# Patient Record
Sex: FEMALE | Race: BLACK OR AFRICAN AMERICAN | Employment: OTHER | RURAL
[De-identification: names, ages, dates, MRNs, and addresses within clinical notes are randomized per-mention and may not be internally consistent; named-entity substitution may affect disease eponyms.]

---

## 2020-03-04 ENCOUNTER — OFFICE VISIT (OUTPATIENT)
Dept: INTERNAL MEDICINE CLINIC | Age: 70
End: 2020-03-04

## 2020-03-04 VITALS
SYSTOLIC BLOOD PRESSURE: 128 MMHG | HEIGHT: 65 IN | HEART RATE: 95 BPM | RESPIRATION RATE: 15 BRPM | WEIGHT: 173 LBS | DIASTOLIC BLOOD PRESSURE: 82 MMHG | TEMPERATURE: 98.1 F | BODY MASS INDEX: 28.82 KG/M2 | OXYGEN SATURATION: 98 %

## 2020-03-04 DIAGNOSIS — I49.5 SICK SINUS SYNDROME (HCC): ICD-10-CM

## 2020-03-04 DIAGNOSIS — M17.11 ARTHRITIS OF KNEE, RIGHT: ICD-10-CM

## 2020-03-04 DIAGNOSIS — E78.5 HYPERLIPIDEMIA, UNSPECIFIED HYPERLIPIDEMIA TYPE: ICD-10-CM

## 2020-03-04 DIAGNOSIS — I10 ESSENTIAL HYPERTENSION: ICD-10-CM

## 2020-03-04 DIAGNOSIS — M32.9 LUPUS (HCC): ICD-10-CM

## 2020-03-04 DIAGNOSIS — M48.062 SPINAL STENOSIS OF LUMBAR REGION WITH NEUROGENIC CLAUDICATION: ICD-10-CM

## 2020-03-04 DIAGNOSIS — F32.1 EPISODE OF MODERATE MAJOR DEPRESSION (HCC): Primary | ICD-10-CM

## 2020-03-04 PROBLEM — M48.061 LUMBAR STENOSIS: Status: ACTIVE | Noted: 2020-03-04

## 2020-03-04 RX ORDER — MELATONIN
2000 DAILY
COMMUNITY

## 2020-03-04 RX ORDER — NITROGLYCERIN 80 MG/1
PATCH TRANSDERMAL
COMMUNITY
Start: 2019-12-07 | End: 2022-01-05

## 2020-03-04 RX ORDER — SIMVASTATIN 40 MG/1
TABLET, FILM COATED ORAL
COMMUNITY
Start: 2020-02-19 | End: 2020-05-21 | Stop reason: SDUPTHER

## 2020-03-04 RX ORDER — TRAMADOL HYDROCHLORIDE 50 MG/1
TABLET ORAL
COMMUNITY
End: 2020-09-30 | Stop reason: ALTCHOICE

## 2020-03-04 RX ORDER — CILOSTAZOL 50 MG/1
TABLET ORAL
COMMUNITY
End: 2020-07-02 | Stop reason: SDUPTHER

## 2020-03-04 RX ORDER — HYDROCHLOROTHIAZIDE 12.5 MG/1
TABLET ORAL
Qty: 30 TAB | Refills: 5 | Status: SHIPPED | OUTPATIENT
Start: 2020-03-04 | End: 2020-07-01 | Stop reason: SDUPTHER

## 2020-03-04 RX ORDER — CARVEDILOL 25 MG/1
TABLET ORAL
COMMUNITY
End: 2020-03-24 | Stop reason: SDUPTHER

## 2020-03-04 RX ORDER — FLUOCINONIDE 0.5 MG/G
CREAM TOPICAL
Qty: 45 G | Refills: 2 | Status: SHIPPED | OUTPATIENT
Start: 2020-03-04 | End: 2020-03-05

## 2020-03-04 RX ORDER — CLONIDINE HYDROCHLORIDE 0.2 MG/1
TABLET ORAL
Qty: 60 TAB | Refills: 5 | Status: SHIPPED | OUTPATIENT
Start: 2020-03-04 | End: 2020-09-21 | Stop reason: SDUPTHER

## 2020-03-04 RX ORDER — CLONIDINE HYDROCHLORIDE 0.2 MG/1
TABLET ORAL
COMMUNITY
End: 2020-03-04 | Stop reason: SDUPTHER

## 2020-03-04 RX ORDER — SERTRALINE HYDROCHLORIDE 50 MG/1
TABLET, FILM COATED ORAL
Qty: 30 TAB | Refills: 5 | Status: SHIPPED | OUTPATIENT
Start: 2020-03-04 | End: 2020-08-29

## 2020-03-04 RX ORDER — HYDROCHLOROTHIAZIDE 12.5 MG/1
TABLET ORAL
COMMUNITY
End: 2020-03-04 | Stop reason: SDUPTHER

## 2020-03-04 RX ORDER — FLUOCINONIDE 0.5 MG/G
CREAM TOPICAL
COMMUNITY
Start: 2014-05-06 | End: 2020-03-04 | Stop reason: SDUPTHER

## 2020-03-04 NOTE — PROGRESS NOTES
Chief Complaint   Patient presents with   [address]     I have reviewed the patient's medical history in detail and updated the computerized patient record. Health Maintenance reviewed. Encouraged pt to discuss pt's wishes with spouse/partner/family and bring them in the next appt to follow thru with the Advanced Directive    Fall Risk Assessment, last 12 mths 3/4/2020   Able to walk? Yes   Fall in past 12 months? No       3 most recent PHQ Screens 3/4/2020   Little interest or pleasure in doing things Nearly every day   Feeling down, depressed, irritable, or hopeless Nearly every day   Total Score PHQ 2 6       Abuse Screening Questionnaire 3/4/2020   Do you ever feel afraid of your partner? N   Are you in a relationship with someone who physically or mentally threatens you? N   Is it safe for you to go home?  Y       ADL Assessment 3/4/2020   Feeding yourself No Help Needed   Getting from bed to chair No Help Needed   Getting dressed No Help Needed   Bathing or showering No Help Needed   Walk across the room (includes cane/walker) No Help Needed   Using the telphone No Help Needed   Taking your medications No Help Needed   Preparing meals No Help Needed   Managing money (expenses/bills) No Help Needed   Moderately strenuous housework (laundry) No Help Needed   Shopping for personal items (toiletries/medicines) No Help Needed   Shopping for groceries No Help Needed   Driving No Help Needed   Climbing a flight of stairs No Help Needed   Getting to places beyond walking distances No Help Needed

## 2020-03-04 NOTE — PROGRESS NOTES
Subjective:     Lupe Dobson is a 65 y.o. female who presents for follow up of hypertension, hyperlipidemia, coronary artery disease and history of prior stroke. New concerns: I have lupus. Gets me down, see phq9. Patient is new to this clinic. Comes in to establish care. Previous care was with . Reason for the change is: re  Mood not good, not suicidal    Recent TKR, still bothering her some  Diet and Lifestyle: nonsmoker    Cardiovascular ROS: taking medications as instructed, no medication side effects noted, no TIA's, no chest pain on exertion, no dyspnea on exertion, no swelling of ankles. Review of Systems, additional:  Pertinent items are noted in HPI. No further syncope followed Grover Posadas has pacemaker    Patient Active Problem List    Diagnosis Date Noted    Lupus ([facility] 03/04/2020    Sick sinus syndrome ([facility] 03/04/2020    Essential hypertension 03/04/2020    Hyperlipidemia 03/04/2020    Lumbar stenosis 03/04/2020    [facility] of [facility] 03/04/2020    Arthritis of right knee 03/04/2020     Current Outpatient Medications   Medication Sig Dispense Refill    carvediloL (COREG) 25 mg tablet carvedilol 12.5 mg tabs      cholecalciferol (VITAMIN D3) (1000 Units /25 mcg) tablet Take 2,000 Units by mouth.       cilostazoL (PLETAL) 50 mg tablet cilostazol 50 mg tablet      nitroglycerin (NITRODUR) 0.4 mg/hr APPLY 1 PATCH FOR 12 TO 14 HOURS DAILY      simvastatin (ZOCOR) 40 mg tablet TAKE 1 TABLET BY MOUTH AT BEDTIME      traMADol (ULTRAM) 50 mg tablet tramadol 50 mg tablet   take 1 tablet by mouth every 6 hours if needed for pain for 14 days      sertraline (ZOLOFT) 50 mg tablet TAKE 1 TABLET BY MOUTH AT BEDTIME 30 Tab 5    cloNIDine HCL (CATAPRES) 0.2 mg tablet clonidine HCl 0.2 mg tablet  take 1 tablet by mouth twice a day 60 Tab 5    hydroCHLOROthiazide (HYDRODIURIL) 12.5 mg tablet hydrochlorothiazide 12.5 mg tablet  take 1 tablet by mouth once daily 30 Tab 5    fluocinoNIDE (LIDEX) 0.05 % topical cream fluocinonide 0.05 % topical cream  Prescribed by non [facility] MD 45 g 2     Allergies   Allergen Reactions    Codeine Hives    Lisinopril Angioedema     Past Medical History:   Diagnosis Date    Arrhythmia     Arthritis     Autoimmune disease ([facility]     CAD (coronary artery disease)     Chronic pain     Depression     Hypertension     Stroke [facility])      Past Surgical History:   Procedure Laterality Date    CARDIAC SURG PROCEDURE UNLIST      pacemaker place    HX HEART CATHETERIZATION      HX HYSTERECTOMY      had cervical ca    HX KNEE REPLACEMENT Right 10/2019    VASCULAR SURGERY PROCEDURE UNLIST       Family History   Problem Relation Age of Onset    Lung Disease Father     Arthritis-osteo Daughter      Social History     Tobacco Use    Smoking status: Former Smoker     Last attempt to quit: 2015     Years since quittin.0    Smokeless tobacco: Former User     Quit date: 3/4/2015   Substance Use Topics    Alcohol use: Yes     Alcohol/week: 3.0 standard drinks     Types: 3 Cans of beer per week                 Objective:     Physical exam significant for the following: WNL    Visit Vitals  /82   Pulse 95   Temp 98.1 °F (36.7 °C) (Temporal)   Resp 15   Ht 5' 5\" (1.651 m)   Wt 173 lb (78.5 kg)   SpO2 98%   BMI 28.79 kg/m²     Appearance: alert, well appearing, and in no distress. General exam: CVS exam BP noted to be well controlled today in office, S1, S2 normal, no gallop, no murmur, chest clear, no JVD, no HSM, no edema. .   Assessment/Plan:     hypertension stable, hyperlipidemia stable, cerebrovascular disease stable. ICD-10-CM ICD-9-CM    1. Episode of moderate major depression (HCC) F32.1 296.22 sertraline (ZOLOFT) 50 mg tablet      cloNIDine HCL (CATAPRES) 0.2 mg tablet   2. Arthritis of knee, right M17.11 716.96 AMB SUPPLY ORDER   3. Lupus ([facility] M32.9 710.0    4.  Essential hypertension I10 401.9 hydroCHLOROthiazide (HYDRODIURIL) 12.5 mg tablet   5. Hyperlipidemia, unspecified hyperlipidemia type E78.5 272.4    6. Sick sinus syndrome (HCC) I49.5 427.81    7. Spinal stenosis of lumbar region with neurogenic claudication M48.062 724.03      Orders Placed This Encounter    AMB SUPPLY ORDER     PT after right TKR 10 2019    carvediloL (COREG) 25 mg tablet     Sig: carvedilol 12.5 mg tabs    cholecalciferol (VITAMIN D3) (1000 Units /25 mcg) tablet     Sig: Take 2,000 Units by mouth.  cilostazoL (PLETAL) 50 mg tablet     Sig: cilostazol 50 mg tablet    DISCONTD: cloNIDine HCL (CATAPRES) 0.2 mg tablet     Sig: clonidine HCl 0.2 mg tablet   take 1 tablet by mouth twice a day    DISCONTD: fluocinoNIDE (LIDEX) 0.05 % topical cream     Sig: fluocinonide 0.05 % topical cream   Prescribed by carlos 942/446 Delmi Mann MD    DISCONTD: hydroCHLOROthiazide (HYDRODIURIL) 12.5 mg tablet     Sig: hydrochlorothiazide 12.5 mg tablet   take 1 tablet by mouth once daily    nitroglycerin (NITRODUR) 0.4 mg/hr     Sig: APPLY 1 PATCH FOR 12 TO 14 HOURS DAILY    simvastatin (ZOCOR) 40 mg tablet     Sig: TAKE 1 TABLET BY MOUTH AT BEDTIME    traMADol (ULTRAM) 50 mg tablet     Sig: tramadol 50 mg tablet   take 1 tablet by mouth every 6 hours if needed for pain for 14 days    sertraline (ZOLOFT) 50 mg tablet     Sig: TAKE 1 TABLET BY MOUTH AT BEDTIME     Dispense:  30 Tab     Refill:  5    cloNIDine HCL (CATAPRES) 0.2 mg tablet     Sig: clonidine HCl 0.2 mg tablet  take 1 tablet by mouth twice a day     Dispense:  60 Tab     Refill:  5    hydroCHLOROthiazide (HYDRODIURIL) 12.5 mg tablet     Sig: hydrochlorothiazide 12.5 mg tablet  take 1 tablet by mouth once daily     Dispense:  30 Tab     Refill:  5    DISCONTD: fluocinoNIDE (LIDEX) 0.05 % topical cream     Sig: fluocinonide 0.05 % topical cream  Prescribed by non [facility] MD     Dispense:  45 g     Refill:  2         We discussed this pain and the usage of controlled substances for arthritis pain relief.  In general these medications are indicated for the acute symptom relief and not for chronic usage, allthough they are frequently used for chronic management  After a certain period of time they should be stopped to avoid dependence and/or addiction. I warned her about the dangers of addiction and other side affects including respiratory depression and death. Discussed how this is a controlled substance and that the prescribing of it is should be monitored very carefully. Drug usage is also monitored by our practise and the [facility], since there has been a lot of abuse and diversion of controlled substances. In general we do not refill this medication over the phone. PLease ask for enough pills to get you to your next appointment and make sure you keep your appointments. Warned not to take other medications like alcohol, other benzodiazapines marijuana or other narcotics when on this medication. Current Outpatient Medications   Medication Sig Dispense Refill    carvediloL (COREG) 25 mg tablet carvedilol 12.5 mg tabs      cholecalciferol (VITAMIN D3) (1000 Units /25 mcg) tablet Take 2,000 Units by mouth.  cilostazoL (PLETAL) 50 mg tablet cilostazol 50 mg tablet      nitroglycerin (NITRODUR) 0.4 mg/hr APPLY 1 PATCH FOR 12 TO 14 HOURS DAILY      simvastatin (ZOCOR) 40 mg tablet TAKE 1 TABLET BY MOUTH AT BEDTIME      traMADol (ULTRAM) 50 mg tablet tramadol 50 mg tablet   take 1 tablet by mouth every 6 hours if needed for pain for 14 days      sertraline (ZOLOFT) 50 mg tablet TAKE 1 TABLET BY MOUTH AT BEDTIME 30 Tab 5    cloNIDine HCL (CATAPRES) 0.2 mg tablet clonidine HCl 0.2 mg tablet  take 1 tablet by mouth twice a day 60 Tab 5    hydroCHLOROthiazide (HYDRODIURIL) 12.5 mg tablet hydrochlorothiazide 12.5 mg tablet  take 1 tablet by mouth once daily 30 Tab 5    fluocinoNIDE (LIDEX) 0.05 % topical cream Apply 0.5 g to affected area two (2) times a day.  As needed 45 g 2     Discussed possible side affects, precautions, and drug interactions and possible benefits of the medication(s).     Follow-up and Dispositions    · Return in about 2 months (around 5/4/2020) for medicare annual.

## 2020-03-05 ENCOUNTER — TELEPHONE (OUTPATIENT)
Dept: INTERNAL MEDICINE CLINIC | Age: 70
End: 2020-03-05

## 2020-03-05 RX ORDER — FLUOCINONIDE 0.5 MG/G
0.5 CREAM TOPICAL 2 TIMES DAILY
Qty: 45 G | Refills: 2 | Status: SHIPPED | OUTPATIENT
Start: 2020-03-05

## 2020-03-24 RX ORDER — CARVEDILOL 25 MG/1
TABLET ORAL
Qty: 90 TAB | Refills: 1 | Status: SHIPPED | OUTPATIENT
Start: 2020-03-24 | End: 2020-05-19 | Stop reason: SDUPTHER

## 2020-03-24 NOTE — TELEPHONE ENCOUNTER
Requested Prescriptions     Pending Prescriptions Disp Refills    carvediloL (COREG) 25 mg tablet 90 Tab 1     Sig: carvedilol 12.5 mg tabs     Last office visit 3/2/20 future 4/1/20  Last filled  3/2/20  Changes made to medication on last visit    None

## 2020-05-04 ENCOUNTER — DOCUMENTATION ONLY (OUTPATIENT)
Dept: INTERNAL MEDICINE CLINIC | Age: 70
End: 2020-05-04

## 2020-05-20 ENCOUNTER — TELEPHONE (OUTPATIENT)
Dept: INTERNAL MEDICINE CLINIC | Age: 70
End: 2020-05-20

## 2020-05-20 RX ORDER — CARVEDILOL 25 MG/1
TABLET ORAL
Qty: 90 TAB | Refills: 1 | Status: SHIPPED | OUTPATIENT
Start: 2020-05-20 | End: 2020-09-30 | Stop reason: SDUPTHER

## 2020-05-20 NOTE — TELEPHONE ENCOUNTER
Shay Loo, pharmacist from [place], called in reference to needing pts directions for her coreg. Please call him at [phone].

## 2020-05-21 ENCOUNTER — TELEPHONE (OUTPATIENT)
Dept: INTERNAL MEDICINE CLINIC | Age: 70
End: 2020-05-21

## 2020-06-26 RX ORDER — CILOSTAZOL 50 MG/1
TABLET ORAL
OUTPATIENT
Start: 2020-06-26

## 2020-07-01 DIAGNOSIS — I10 ESSENTIAL HYPERTENSION: ICD-10-CM

## 2020-07-01 NOTE — TELEPHONE ENCOUNTER
Requested Prescriptions     Pending Prescriptions Disp Refills    hydroCHLOROthiazide (HYDRODIURIL) 12.5 mg tablet 90 Tab 1     Sig: hydrochlorothiazide 12.5 mg tablet  take 1 tablet by mouth once daily     Last office visit 3/4/20 future ?   Last filled  3/4/20  Changes made to medication on last visit      None

## 2020-07-02 RX ORDER — CILOSTAZOL 50 MG/1
50 TABLET ORAL DAILY
Qty: 90 TAB | Refills: 1 | Status: SHIPPED | OUTPATIENT
Start: 2020-07-02 | End: 2020-09-30

## 2020-07-02 RX ORDER — HYDROCHLOROTHIAZIDE 12.5 MG/1
TABLET ORAL
Qty: 90 TAB | Refills: 1 | Status: SHIPPED | OUTPATIENT
Start: 2020-07-02 | End: 2021-03-09

## 2020-07-06 ENCOUNTER — OFFICE VISIT (OUTPATIENT)
Dept: INTERNAL MEDICINE CLINIC | Age: 70
End: 2020-07-06

## 2020-07-06 VITALS
BODY MASS INDEX: 29.16 KG/M2 | TEMPERATURE: 99 F | DIASTOLIC BLOOD PRESSURE: 93 MMHG | RESPIRATION RATE: 16 BRPM | WEIGHT: 175 LBS | HEIGHT: 65 IN | OXYGEN SATURATION: 96 % | HEART RATE: 101 BPM | SYSTOLIC BLOOD PRESSURE: 161 MMHG

## 2020-07-06 DIAGNOSIS — F32.1 EPISODE OF MODERATE MAJOR DEPRESSION (HCC): ICD-10-CM

## 2020-07-06 DIAGNOSIS — I10 ESSENTIAL HYPERTENSION: ICD-10-CM

## 2020-07-06 DIAGNOSIS — I49.5 SICK SINUS SYNDROME (HCC): ICD-10-CM

## 2020-07-06 DIAGNOSIS — R42 DIZZINESS: Primary | ICD-10-CM

## 2020-07-06 DIAGNOSIS — M32.9 LUPUS (HCC): ICD-10-CM

## 2020-07-06 DIAGNOSIS — M17.11 ARTHRITIS OF RIGHT KNEE: ICD-10-CM
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
## 2020-07-06 NOTE — PROGRESS NOTES
HISTORY OF PRESENT Omer Sarmiento is a 65 y.o. female. Dizziness    The history is provided by the patient. This is a new problem. The current episode started more than 2 days ago (5 days since off pletal). The problem occurs hourly. Associated symptoms include dizziness. Pertinent negatives include no focal weakness. Fall   Incident onset: 5 days. The fall occurred while walking. She fell from a height of 3 - 5 ft. She landed on hard floor. There was no blood loss. The point of impact was the left knee, right knee and left shoulder. hit arm not head    Review of Systems   Musculoskeletal: Positive for falls. Neurological: Positive for dizziness. Negative for focal weakness. Patient Active Problem List   Diagnosis Code    Lupus ([facility] M32.9    Sick sinus syndrome ([facility] I49.5    Essential hypertension I10    Hyperlipidemia E78.5    Lumbar stenosis M48.061    Episode of moderate major depression ([facility] F32.1    Arthritis of right knee M17.11     Social History     Socioeconomic History    Marital status:      Spouse name: Not on file    Number of children: Not on file    Years of education: Not on file    Highest education level: Not on file   Occupational History    Not on file   Social Needs    Financial resource strain: Not on file    Food insecurity     Worry: Not on file     Inability: Not on file    Transportation needs     Medical: Not on file     Non-medical: Not on file   Tobacco Use    Smoking status: Former Smoker     Last attempt to quit: 2015     Years since quittin.3    Smokeless tobacco: Former User     Quit date: 3/4/2015   Substance and Sexual Activity    Alcohol use:  Yes     Alcohol/week: 3.0 standard drinks     Types: 3 Cans of beer per week    Drug use: Not Currently    Sexual activity: Not Currently   Lifestyle    Physical activity     Days per week: Not on file     Minutes per session: Not on file    Stress: Not on file   Relationships    Social connections     Talks on phone: Not on file     Gets together: Not on file     Attends Zoroastrianism service: Not on file     Active member of club or organization: Not on file     Attends meetings of clubs or organizations: Not on file     Relationship status: Not on file    Intimate partner violence     Fear of current or ex partner: Not on file     Emotionally abused: Not on file     Physically abused: Not on file     Forced sexual activity: Not on file   Other Topics Concern    Not on file   Social History Narrative    Not on file       Physical Exam  Visit Vitals  BP (!) 161/93   Pulse (!) 101   Temp 99 °F (37.2 °C) (Temporal)   Resp 16   Ht 5' 5\" (1.651 m)   Wt 175 lb (79.4 kg)   SpO2 96%   BMI 29.12 kg/m²     Well developed well nourished no acute distress. Pupils equal round react to light, extraocular muscles intact. Tympanic membranes within normal limits throat unremarkable  Cranial nerves II through XII are intact. Neck unremarkable  Heart regular rate and rhythm without clicks murmurs rubs  Lungs are clear to auscultation  Abdomen soft. Extremities, motor 5 out of 5 bilaterally, reflexes 2+ equal bilaterally. Gait no focality  ASSESSMENT and PLAN  Encounter Diagnoses   Name Primary?  Dizziness Yes    Fall, initial encounter     Sick sinus syndrome ([address]     Lupus ([address]     Essential hypertension     Episode of moderate major depression ([address]     Arthritis of right knee      Orders Placed This Encounter    XR KNEE RT MAX 2 VWS    XR SHOULDER LT AP/LAT MIN 2 V    CT HEAD WO CONT    REFERRAL TO CARDIOLOGY     W/u as above, r/o CVA  Cards to check pacemeaker. Follow-up and Dispositions    · Return in about 10 days (around 7/16/2020) for routine follow up.

## 2020-07-06 NOTE — PROGRESS NOTES
Chief Complaint   Patient presents with    Dizziness     out of med cilostazol for 4 days, dizziness and off balance for one week, loss of appetite, headaches, swelling around pacemaker upper L/chest    Fall     pt fell last week and landed on both knees     Health Maintenance reviewed. I have reviewed the patient's medical history in detail and updated the computerized patient record. Liam Fernandez

## 2020-08-29 DIAGNOSIS — F32.1 EPISODE OF MODERATE MAJOR DEPRESSION (HCC): ICD-10-CM

## 2020-08-29 RX ORDER — SERTRALINE HYDROCHLORIDE 50 MG/1
TABLET, FILM COATED ORAL
Qty: 30 TAB | Refills: 5 | Status: SHIPPED | OUTPATIENT
Start: 2020-08-29 | End: 2021-03-24

## 2020-09-10 ENCOUNTER — TELEPHONE (OUTPATIENT)
Dept: INTERNAL MEDICINE CLINIC | Age: 70
End: 2020-09-10

## 2020-09-10 DIAGNOSIS — Z12.39 SCREENING FOR BREAST CANCER: Primary | ICD-10-CM
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
## 2020-09-15 DIAGNOSIS — R42 DIZZINESS: ICD-10-CM

## 2020-09-21 ENCOUNTER — TELEPHONE (OUTPATIENT)
Dept: INTERNAL MEDICINE CLINIC | Age: 70
End: 2020-09-21

## 2020-09-21 DIAGNOSIS — F32.1 EPISODE OF MODERATE MAJOR DEPRESSION (HCC): ICD-10-CM

## 2020-09-21 NOTE — TELEPHONE ENCOUNTER
Requested Prescriptions     Pending Prescriptions Disp Refills    cloNIDine HCL (CATAPRES) 0.2 mg tablet 60 Tab 5     Sig: clonidine HCl 0.2 mg tablet  take 1 tablet by mouth twice a day       Pt said this medication should be twice a day. Please correct order and [facility].

## 2020-09-21 NOTE — TELEPHONE ENCOUNTER
Pt called in reference to her mammogram. She needs an order for a diagnostic mammo. 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 Please call her at [phone].

## 2020-09-22 RX ORDER — CLONIDINE HYDROCHLORIDE 0.2 MG/1
TABLET ORAL
Qty: 60 TAB | Refills: 5 | Status: SHIPPED | OUTPATIENT
Start: 2020-09-22 | End: 2021-03-22

## 2020-09-30 ENCOUNTER — OFFICE VISIT (OUTPATIENT)
Dept: INTERNAL MEDICINE CLINIC | Age: 70
End: 2020-09-30
Payer: MEDICARE

## 2020-09-30 VITALS
DIASTOLIC BLOOD PRESSURE: 86 MMHG | TEMPERATURE: 97.5 F | SYSTOLIC BLOOD PRESSURE: 137 MMHG | HEART RATE: 96 BPM | OXYGEN SATURATION: 98 % | WEIGHT: 177 LBS | RESPIRATION RATE: 16 BRPM | BODY MASS INDEX: 29.49 KG/M2 | HEIGHT: 65 IN

## 2020-09-30 DIAGNOSIS — Z23 ENCOUNTER FOR IMMUNIZATION: ICD-10-CM

## 2020-09-30 DIAGNOSIS — I25.118 CORONARY ARTERY DISEASE OF NATIVE ARTERY OF NATIVE HEART WITH STABLE ANGINA PECTORIS (HCC): ICD-10-CM

## 2020-09-30 DIAGNOSIS — R06.09 DYSPNEA ON EXERTION: Primary | ICD-10-CM

## 2020-09-30 DIAGNOSIS — R94.31 ABNORMAL EKG: ICD-10-CM

## 2020-09-30 DIAGNOSIS — R23.4 BREAST SKIN CHANGES: ICD-10-CM

## 2020-09-30 PROCEDURE — 1090F PRES/ABSN URINE INCON ASSESS: CPT | Performed by: FAMILY MEDICINE

## 2020-09-30 PROCEDURE — G8752 SYS BP LESS 140: HCPCS | Performed by: FAMILY MEDICINE

## 2020-09-30 PROCEDURE — 3017F COLORECTAL CA SCREEN DOC REV: CPT | Performed by: FAMILY MEDICINE

## 2020-09-30 PROCEDURE — G8754 DIAS BP LESS 90: HCPCS | Performed by: FAMILY MEDICINE

## 2020-09-30 PROCEDURE — G8536 NO DOC ELDER MAL SCRN: HCPCS | Performed by: FAMILY MEDICINE

## 2020-09-30 PROCEDURE — G8419 CALC BMI OUT NRM PARAM NOF/U: HCPCS | Performed by: FAMILY MEDICINE

## 2020-09-30 PROCEDURE — G0008 ADMIN INFLUENZA VIRUS VAC: HCPCS | Performed by: FAMILY MEDICINE

## 2020-09-30 PROCEDURE — G9717 DOC PT DX DEP/BP F/U NT REQ: HCPCS | Performed by: FAMILY MEDICINE

## 2020-09-30 PROCEDURE — 1100F PTFALLS ASSESS-DOCD GE2>/YR: CPT | Performed by: FAMILY MEDICINE

## 2020-09-30 PROCEDURE — G8427 DOCREV CUR MEDS BY ELIG CLIN: HCPCS | Performed by: FAMILY MEDICINE

## 2020-09-30 PROCEDURE — 3288F FALL RISK ASSESSMENT DOCD: CPT | Performed by: FAMILY MEDICINE

## 2020-09-30 PROCEDURE — 93000 ELECTROCARDIOGRAM COMPLETE: CPT | Performed by: FAMILY MEDICINE

## 2020-09-30 PROCEDURE — 99214 OFFICE O/P EST MOD 30 MIN: CPT | Performed by: FAMILY MEDICINE

## 2020-09-30 PROCEDURE — 90653 IIV ADJUVANT VACCINE IM: CPT

## 2020-09-30 PROCEDURE — G8400 PT W/DXA NO RESULTS DOC: HCPCS | Performed by: FAMILY MEDICINE

## 2020-09-30 RX ORDER — CILOSTAZOL 50 MG/1
50 TABLET ORAL 2 TIMES DAILY
Qty: 180 TAB | Refills: 3 | Status: SHIPPED | OUTPATIENT
Start: 2020-09-30 | End: 2021-10-02

## 2020-09-30 RX ORDER — SIMVASTATIN 40 MG/1
TABLET, FILM COATED ORAL
Qty: 90 TAB | Refills: 1 | Status: SHIPPED | OUTPATIENT
Start: 2020-09-30 | End: 2021-03-24 | Stop reason: SDUPTHER

## 2020-09-30 RX ORDER — CARVEDILOL 25 MG/1
25 TABLET ORAL DAILY
Qty: 90 TAB | Refills: 1 | Status: SHIPPED | OUTPATIENT
Start: 2020-09-30 | End: 2020-11-19 | Stop reason: SDUPTHER

## 2020-09-30 NOTE — PROGRESS NOTES
Chief Complaint   Patient presents with    Hypertension     med evaluation     Breast pain     L/breast sharp pain when pt moves a certain way after her fall     Knee Pain     R/knee feels heavy after the fall       Patient has not been out of the country in (6-7 months), NO diarrhea, NO cough, NO chest conjestion, NO temp. Pt has not been around anyone with these symptoms. Health Maintenance reviewed. I have reviewed the patient's medical history in detail and updated the computerized patient record. 1. Have you been to the ER, urgent care clinic since your last visit? Hospitalized since your last visit?no    2. Have you seen or consulted any other health care providers outside of the [address] [place] [address] since your last visit? Include any pap smears or colon screening. No      Encouraged pt to discuss pt's wishes with spouse/partner/family and bring them in the next appt to follow thru with the Advanced Directive    Fall Risk Assessment, last 12 mths 9/30/2020   Able to walk? Yes   Fall in past 12 months? Yes   Fall with injury?  Yes   Number of falls in past 12 months 1   Fall Risk Score 2       3 most recent PHQ Screens 9/30/2020   Little interest or pleasure in doing things More than half the days   Feeling down, depressed, irritable, or hopeless More than half the days   Total Score PHQ 2 4   Trouble falling or staying asleep, or sleeping too much -   Feeling tired or having little energy -   Poor appetite, weight loss, or overeating -   Feeling bad about yourself - or that you are a failure or have let yourself or your family down -   Trouble concentrating on things such as school, work, reading, or watching TV -   Moving or speaking so slowly that other people could have noticed; or the opposite being so fidgety that others notice -   Thoughts of being better off dead, or hurting yourself in some way -   PHQ 9 Score -   How difficult have these problems made it for you to do your work, take care of your home and get along with others -       Abuse Screening Questionnaire 9/30/2020   Do you ever feel afraid of your partner? N   Are you in a relationship with someone who physically or mentally threatens you? N   Is it safe for you to go home?  Y       ADL Assessment 9/30/2020   Feeding yourself No Help Needed   Getting from bed to chair No Help Needed   Getting dressed No Help Needed   Bathing or showering No Help Needed   Walk across the room (includes cane/walker) No Help Needed   Using the telphone No Help Needed   Taking your medications No Help Needed   Preparing meals No Help Needed   Managing money (expenses/bills) No Help Needed   Moderately strenuous housework (laundry) No Help Needed   Shopping for personal items (toiletries/medicines) No Help Needed   Shopping for groceries No Help Needed   Driving No Help Needed   Climbing a flight of stairs No Help Needed   Getting to places beyond walking distances No Help Needed

## 2020-09-30 NOTE — PROGRESS NOTES
Subjective:     Pauline Cortez is a 65 y.o. female who presents for follow up of hypertension, hyperlipidemia, coronary artery disease and sick sinus syndrome. New concerns: For the past few months complains of exertional sweating gets somewhat dyspneic as well. Not really having chest pain or chest tightness just gets sweaty. Cardiology care at [address] [place] [address], not had any recent cardiac studies done. Diet and Lifestyle: nonsmoker    Cardiovascular ROS: taking medications as instructed, no medication side effects noted, no TIA's, no chest pain on exertion, notes new dyspnea on exertion for a few months in addition to her sweating, no swelling of ankles. Review of Systems, additional:  Pertinent items are noted in HPI. Patient Active Problem List    Diagnosis Date Noted    Coronary artery disease with stable angina pectoris ([facility] 09/30/2020    Lupus ([facility] 03/04/2020    Sick sinus syndrome ([facility] 03/04/2020    Essential hypertension 03/04/2020    Hyperlipidemia 03/04/2020    Lumbar stenosis 03/04/2020    Episode of moderate major depression ([facility] 03/04/2020    Arthritis of right knee 03/04/2020     Current Outpatient Medications   Medication Sig Dispense Refill    cilostazoL (PLETAL) 50 mg tablet Take 1 Tab by mouth two (2) times a day. 180 Tab 3    carvediloL (COREG) 25 mg tablet Take 1 Tab by mouth daily. carvedilol 12.5 mg tabs 90 Tab 1    simvastatin (ZOCOR) 40 mg tablet TAKE 1 TABLET BY MOUTH AT BEDTIME 90 Tab 1    cloNIDine HCL (CATAPRES) 0.2 mg tablet clonidine HCl 0.2 mg tablet  take 1 tablet by mouth twice a day 60 Tab 5    sertraline (ZOLOFT) 50 mg tablet TAKE 1 TABLET BY MOUTH AT BEDTIME 30 Tab 5    hydroCHLOROthiazide (HYDRODIURIL) 12.5 mg tablet hydrochlorothiazide 12.5 mg tablet  take 1 tablet by mouth once daily 90 Tab 1    fluocinoNIDE (LIDEX) 0.05 % topical cream Apply 0.5 g to affected area two (2) times a day.  As needed 45 g 2    cholecalciferol (VITAMIN D3) (1000 Units /25 mcg) tablet Take 2,000 Units by mouth.  nitroglycerin (NITRODUR) 0.4 mg/hr APPLY 1 PATCH FOR 12 TO 14 HOURS DAILY       Allergies   Allergen Reactions    Codeine Hives    Lisinopril Angioedema     Social History     Tobacco Use    Smoking status: Former Smoker     Last attempt to quit: 2015     Years since quittin.5    Smokeless tobacco: Former User     Quit date: 3/4/2015   Substance Use Topics    Alcohol use: Yes     Alcohol/week: 3.0 standard drinks     Types: 3 Cans of beer per week                Objective:     Physical exam significant for the following:     Within normal limits    Visit Vitals  /86 (BP 1 Location: Left arm, BP Patient Position: Sitting)   Pulse 96   Temp 97.5 °F (36.4 °C) (Temporal)   Resp 16   Ht 5' 5\" (1.651 m)   Wt 177 lb (80.3 kg)   SpO2 98%   BMI 29.45 kg/m²     Appearance: alert, well appearing, and in no distress. General exam: CVS exam BP noted to be well controlled today in office, S1, S2 normal, no gallop, no murmur, chest clear, no JVD, no HSM, no edema. .   EKG performed shows T wave inversions especially in the lateral limb leads, normal sinus rhythm. No prior EKG to compare to but this study is compatible with angina. Assessment/Plan:     hypertension well controlled, stable, coronary artery disease control uncertain. ICD-10-CM ICD-9-CM    1. Dyspnea on exertion  R06.00 786.09 AMB POC EKG ROUTINE W/ 12 LEADS, INTER & REP      REFERRAL TO CARDIOLOGY      ADMIN INFLUENZA VIRUS VAC   2. Breast skin changes  R23.4 782.8 JESSI MAMMOGRAM DIAG BILAT SAME DAY INCL CAD      ADMIN INFLUENZA VIRUS VAC   3. Abnormal EKG  R94.31 794.31 REFERRAL TO CARDIOLOGY      ADMIN INFLUENZA VIRUS VAC   4. Coronary artery disease of native artery of native heart with stable angina pectoris (HCC)  I25.118 414.01 ADMIN INFLUENZA VIRUS VAC     413.9    5.  Encounter for immunization  Z23 V03.89 INFLUENZA VACCINE INACTIVATED (IIV), SUBUNIT, ADJUVANTED, IM      ADMIN INFLUENZA VIRUS VAC     Orders Placed This Encounter    JESSI MAMMOGRAM DIAG DIGITAL BILAT SAME DAY     Standing Status:   Future     Standing Expiration Date:   10/30/2021     Scheduling Instructions:      [facility]    INFLUENZA VACCINE INACTIVATED (IIV), SUBUNIT, ADJUVANTED, IM    REFERRAL TO CARDIOLOGY     Referral Priority:   Routine     Referral Type:   Consultation     Referral Reason:   Specialty Services Required     Referred to Provider:   Edgard Gant MD     Number of Visits Requested:   1    AMB POC EKG ROUTINE W/ 12 LEADS, INTER & REP     Order Specific Question:   Reason for Exam:     Answer:   exertional sweating    ADMIN INFLUENZA VIRUS VAC    cilostazoL (PLETAL) 50 mg tablet     Sig: Take 1 Tab by mouth two (2) times a day. Dispense:  180 Tab     Refill:  3    carvediloL (COREG) 25 mg tablet     Sig: Take 1 Tab by mouth daily. carvedilol 12.5 mg tabs     Dispense:  90 Tab     Refill:  1    simvastatin (ZOCOR) 40 mg tablet     Sig: TAKE 1 TABLET BY MOUTH AT BEDTIME     Dispense:  90 Tab     Refill:  1     Needs cardiac studies done, needs her pacemaker checked, needs to go back to cardiology for further evaluation, referral done. Continue current medications. Mammogram ordered  Follow-up 4 weeks, chest pain and dyspnea precautions given.
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
## 2020-10-05 DIAGNOSIS — M17.11 ARTHRITIS OF RIGHT KNEE: ICD-10-CM

## 2020-11-19 ENCOUNTER — TELEPHONE (OUTPATIENT)
Dept: INTERNAL MEDICINE CLINIC | Age: 70
End: 2020-11-19

## 2020-11-19 RX ORDER — CARVEDILOL 25 MG/1
25 TABLET ORAL DAILY
Qty: 90 TAB | Refills: 1 | Status: SHIPPED | OUTPATIENT
Start: 2020-11-19 | End: 2021-03-24 | Stop reason: SDUPTHER

## 2020-11-19 RX ORDER — CARVEDILOL 25 MG/1
25 TABLET ORAL DAILY
Qty: 90 TAB | Refills: 1 | Status: SHIPPED | OUTPATIENT
Start: 2020-11-19 | End: 2020-11-19 | Stop reason: SDUPTHER

## 2020-11-19 NOTE — TELEPHONE ENCOUNTER
Pharmacist states that he has received a prescription for Carvedilol 25mg, but in the sig it states 1 daily, but then 12.5mg tabs - unsure of what this direction means - please send in a new prescription with new instructions to [address], [place]  [id number]  4:42 PM

## 2020-11-19 NOTE — TELEPHONE ENCOUNTER
[facility] Pharmacist called questioning RX for Coreg. Directions say to take Coreg 25 mg once  a day. BUT pt is stating you told her to take 25 mg Coreg (1/2 tab 12.5mg) BID.   Which one????  I need to call Lianet and patient back

## 2021-03-06 DIAGNOSIS — I10 ESSENTIAL HYPERTENSION: ICD-10-CM

## 2021-03-09 RX ORDER — HYDROCHLOROTHIAZIDE 12.5 MG/1
TABLET ORAL
Qty: 90 TAB | Refills: 1 | Status: SHIPPED | OUTPATIENT
Start: 2021-03-09 | End: 2021-08-18

## 2021-03-22 DIAGNOSIS — F32.1 EPISODE OF MODERATE MAJOR DEPRESSION (HCC): ICD-10-CM

## 2021-03-22 RX ORDER — CLONIDINE HYDROCHLORIDE 0.2 MG/1
TABLET ORAL
Qty: 60 TAB | Refills: 5 | Status: SHIPPED | OUTPATIENT
Start: 2021-03-22 | End: 2021-09-13

## 2021-03-24 ENCOUNTER — VIRTUAL VISIT (OUTPATIENT)
Dept: INTERNAL MEDICINE CLINIC | Age: 71
End: 2021-03-24
Payer: MEDICARE

## 2021-03-24 DIAGNOSIS — Z13.6 SCREENING FOR ISCHEMIC HEART DISEASE: ICD-10-CM

## 2021-03-24 DIAGNOSIS — F32.1 EPISODE OF MODERATE MAJOR DEPRESSION (HCC): ICD-10-CM

## 2021-03-24 DIAGNOSIS — K63.5 POLYP OF DESCENDING COLON, UNSPECIFIED TYPE: ICD-10-CM

## 2021-03-24 DIAGNOSIS — Z13.31 SCREENING FOR DEPRESSION: ICD-10-CM

## 2021-03-24 DIAGNOSIS — M32.9 LUPUS (HCC): ICD-10-CM

## 2021-03-24 DIAGNOSIS — E78.5 HYPERLIPIDEMIA, UNSPECIFIED HYPERLIPIDEMIA TYPE: ICD-10-CM

## 2021-03-24 DIAGNOSIS — I10 ESSENTIAL HYPERTENSION: ICD-10-CM

## 2021-03-24 DIAGNOSIS — I49.5 SICK SINUS SYNDROME (HCC): ICD-10-CM

## 2021-03-24 DIAGNOSIS — Z00.00 MEDICARE ANNUAL WELLNESS VISIT, SUBSEQUENT: Primary | ICD-10-CM

## 2021-03-24 DIAGNOSIS — Z12.39 ENCOUNTER FOR SCREENING FOR MALIGNANT NEOPLASM OF BREAST, UNSPECIFIED SCREENING MODALITY: ICD-10-CM

## 2021-03-24 DIAGNOSIS — M81.0 AGE-RELATED OSTEOPOROSIS WITHOUT CURRENT PATHOLOGICAL FRACTURE: ICD-10-CM

## 2021-03-24 PROCEDURE — 99214 OFFICE O/P EST MOD 30 MIN: CPT | Performed by: FAMILY MEDICINE

## 2021-03-24 PROCEDURE — 1100F PTFALLS ASSESS-DOCD GE2>/YR: CPT | Performed by: FAMILY MEDICINE

## 2021-03-24 PROCEDURE — 3288F FALL RISK ASSESSMENT DOCD: CPT | Performed by: FAMILY MEDICINE

## 2021-03-24 PROCEDURE — G0439 PPPS, SUBSEQ VISIT: HCPCS | Performed by: FAMILY MEDICINE

## 2021-03-24 PROCEDURE — G8427 DOCREV CUR MEDS BY ELIG CLIN: HCPCS | Performed by: FAMILY MEDICINE

## 2021-03-24 PROCEDURE — 3017F COLORECTAL CA SCREEN DOC REV: CPT | Performed by: FAMILY MEDICINE

## 2021-03-24 PROCEDURE — G8756 NO BP MEASURE DOC: HCPCS | Performed by: FAMILY MEDICINE

## 2021-03-24 PROCEDURE — 1090F PRES/ABSN URINE INCON ASSESS: CPT | Performed by: FAMILY MEDICINE

## 2021-03-24 PROCEDURE — G8536 NO DOC ELDER MAL SCRN: HCPCS | Performed by: FAMILY MEDICINE

## 2021-03-24 PROCEDURE — G8419 CALC BMI OUT NRM PARAM NOF/U: HCPCS | Performed by: FAMILY MEDICINE

## 2021-03-24 PROCEDURE — G9717 DOC PT DX DEP/BP F/U NT REQ: HCPCS | Performed by: FAMILY MEDICINE

## 2021-03-24 RX ORDER — CARVEDILOL 25 MG/1
25 TABLET ORAL DAILY
Qty: 90 TAB | Refills: 1 | Status: SHIPPED | OUTPATIENT
Start: 2021-03-24 | End: 2021-10-04 | Stop reason: SDUPTHER

## 2021-03-24 RX ORDER — SIMVASTATIN 40 MG/1
TABLET, FILM COATED ORAL
Qty: 90 TAB | Refills: 1 | Status: SHIPPED | OUTPATIENT
Start: 2021-03-24 | End: 2021-08-30

## 2021-03-24 RX ORDER — SERTRALINE HYDROCHLORIDE 100 MG/1
100 TABLET, FILM COATED ORAL DAILY
Qty: 90 TAB | Refills: 3 | Status: SHIPPED | OUTPATIENT
Start: 2021-03-24 | End: 2021-09-21

## 2021-03-24 RX ORDER — SERTRALINE HYDROCHLORIDE 100 MG/1
100 TABLET, FILM COATED ORAL DAILY
Qty: 30 TAB | Refills: 5 | Status: SHIPPED | OUTPATIENT
Start: 2021-03-24 | End: 2021-03-24 | Stop reason: SDUPTHER

## 2021-03-24 NOTE — PROGRESS NOTES
This is the Subsequent Medicare Annual Wellness Exam, performed 12 months or more after the Initial AWV or the last Subsequent AWV    I have reviewed the patient's medical history in detail and updated the computerized patient record. States she feels poorly, complains of breast pain, generalized weakness. Wants a mammogram.  No chest pain or shortness of breath. Feels depressed not suicidal.  Tired all the time poor energy. Depression Risk Factor Screening:     3 most recent PHQ Screens 3/24/2021   Little interest or pleasure in doing things Several days   Feeling down, depressed, irritable, or hopeless Several days   Total Score PHQ 2 2   Trouble falling or staying asleep, or sleeping too much -   Feeling tired or having little energy -   Poor appetite, weight loss, or overeating -   Feeling bad about yourself - or that you are a failure or have let yourself or your family down -   Trouble concentrating on things such as school, work, reading, or watching TV -   Moving or speaking so slowly that other people could have noticed; or the opposite being so fidgety that others notice -   Thoughts of being better off dead, or hurting yourself in some way -   PHQ 9 Score -   How difficult have these problems made it for you to do your work, take care of your home and get along with others -       Alcohol Risk Screen    Do you average more than 1 drink per night or more than 7 drinks a week:  No    On any one occasion in the past three months have you have had more than 3 drinks containing alcohol:  No        Functional Ability and Level of Safety:    Hearing: Hearing is good. Activities of Daily Living: The home contains: grab bars  Patient does total self care      Ambulation: with difficulty, uses a cane and occa     Fall Risk:  Fall Risk Assessment, last 12 mths 3/24/2021   Able to walk? Yes   Fall in past 12 months? 0   Do you feel unsteady?  0   Are you worried about falling 0   Number of falls in past 12 months -   Fall with injury? -      Abuse Screen:  Patient is not abused       Cognitive Screening    Has your family/caregiver stated any concerns about your memory: yes - daughter     Cognitive Screening: Normal - 11/11    Assessment/Plan   Education and counseling provided:  Are appropriate based on today's review and evaluation  Screening Mammography  Cardiovascular screening blood test  Bone mass measurement (DEXA)  Diabetes screening test    Diagnoses and all orders for this visit:    1. Medicare annual wellness visit, subsequent    2. Screening for depression  -     DEPRESSION SCREEN ANNUAL    3. Screening for ischemic heart disease        ICD-10-CM ICD-9-CM    1. Encounter for screening for malignant neoplasm of breast, unspecified screening modality  Z12.39 V76.10 JESSI 3D AMADA W MAMMO BI DX INCL CAD   2. Medicare annual wellness visit, subsequent  Z00.00 V70.0    3. Screening for depression  Z13.31 V79.0 DEPRESSION SCREEN ANNUAL   4. Screening for ischemic heart disease  Z13.6 V81.0    5. Episode of moderate major depression (HCC)  F32.1 296.22 sertraline (ZOLOFT) 100 mg tablet      DISCONTINUED: sertraline (ZOLOFT) 100 mg tablet   6. Essential hypertension  A81 132.9 METABOLIC PANEL, COMPREHENSIVE      TSH 3RD GENERATION      METABOLIC PANEL, COMPREHENSIVE      TSH 3RD GENERATION   7. Hyperlipidemia, unspecified hyperlipidemia type  E78.5 272.4 LIPID PANEL      LIPID PANEL   8. Lupus (HCC)  M32.9 710.0 CBC W/O DIFF      SED RATE (ESR)      CBC W/O DIFF      SED RATE (ESR)      DNA AB, DOUBLE STRANDED, IGG      DNA AB, DOUBLE STRANDED, IGG   9. Sick sinus syndrome (HCC)  I49.5 427.81    10. Age-related osteoporosis without current pathological fracture  M81.0 733.01 DEXA BONE DENSITY STUDY AXIAL   11.  Polyp of descending colon, unspecified type  K63.5 211.3      Orders Placed This Encounter    ANNUAL DEPRESSION SCREEN 8-15 MIN    JESSI 3D AMADA W MAMMO BI DX INCL CAD     Standing Status:   Future Standing Expiration Date:   4/24/2022     Scheduling Instructions:      MACARIO colvin     Order Specific Question:   Reason for Exam     Answer:   screening breast cancer    DEXA BONE DENSITY STUDY AXIAL     Standing Status:   Future     Standing Expiration Date:   4/25/2022     Scheduling Instructions:      MACARIO colvin     Order Specific Question:   Reason for Exam     Answer:   screening    LIPID PANEL     Standing Status:   Future     Number of Occurrences:   1     Standing Expiration Date:   4/32/4651    METABOLIC PANEL, COMPREHENSIVE     Standing Status:   Future     Number of Occurrences:   1     Standing Expiration Date:   9/24/2021    CBC W/O DIFF     Standing Status:   Future     Number of Occurrences:   1     Standing Expiration Date:   9/24/2021    SED RATE (ESR)     Standing Status:   Future     Number of Occurrences:   1     Standing Expiration Date:   9/24/2021    TSH 3RD GENERATION     Standing Status:   Future     Number of Occurrences:   1     Standing Expiration Date:   9/23/2021    DNA AB, DOUBLE STRANDED, IGG     Standing Status:   Future     Number of Occurrences:   1     Standing Expiration Date:   3/24/2022    DISCONTD: sertraline (ZOLOFT) 100 mg tablet     Sig: Take 1 Tab by mouth daily. Dispense:  30 Tab     Refill:  5    carvediloL (COREG) 25 mg tablet     Sig: Take 1 Tab by mouth daily. Dispense:  90 Tab     Refill:  1    simvastatin (ZOCOR) 40 mg tablet     Sig: TAKE 1 TABLET BY MOUTH AT BEDTIME     Dispense:  90 Tab     Refill:  1    sertraline (ZOLOFT) 100 mg tablet     Sig: Take 1 Tab by mouth daily. Dispense:  90 Tab     [facility]    Patient was instructed on the limits of making a diagnosis at this visit. Was instructed to call us or go to the emergency room if the symptoms increased or if new symptoms appeared. Follow-up and Dispositions    · Return in about 2 weeks (around 4/7/2021) for routine follow up. Health Maintenance Due     Health Maintenance Due   Topic Date Due    Hepatitis C Screening  Never done    Lipid Screen  Never done    COVID-19 Vaccine (1) Never done    Shingrix Vaccine Age 50> (1 of 2) Never done    Colorectal Cancer Screening Combo  Never done    Breast Cancer Screen Mammogram  Never done    Bone Densitometry (Dexa) Screening  Never done    Pneumococcal 65+ years (2 of 2 - PPSV23) 01/31/2013       Patient Care Team   Patient Care Team:  Alea Rae MD as PCP - General (Family Medicine)  Alea Rae MD as PCP - [facility] Empaneled Provider  Ortho = wind t  History     Patient Active Problem List   Diagnosis Code    Lupus [facility]) M32.9    Sick sinus syndrome ([facility] I49.5    Essential hypertension I10    Hyperlipidemia E78.5    Lumbar stenosis M48.061    Episode of moderate major depression ([facility] F32.1    Arthritis of right knee M17.11    Coronary artery disease with stable angina pectoris ([address] I25.118     Past Medical History:   Diagnosis Date    Arrhythmia     Arthritis     Autoimmune disease ([facility]     CAD (coronary artery disease)     Chronic pain     Depression     Hypertension     Stroke [facility])       Past Surgical History:   Procedure Laterality Date    HX HEART CATHETERIZATION      HX HYSTERECTOMY      had cervical ca    HX KNEE REPLACEMENT Right 10/2019    FL CARDIAC SURG PROCEDURE UNLIST      pacemaker place    VASCULAR SURGERY PROCEDURE UNLIST       Current Outpatient Medications   Medication Sig Dispense Refill    cloNIDine HCL (CATAPRES) 0.2 mg tablet TAKE 1 TABLET BY MOUTH TWICE DAILY 60 Tab 5    hydroCHLOROthiazide (HYDRODIURIL) 12.5 mg tablet TAKE 1 TABLET BY MOUTH EVERY DAY 90 Tab 1    carvediloL (COREG) 25 mg tablet Take 1 Tab by mouth daily. 90 Tab 1    cilostazoL (PLETAL) 50 mg tablet Take 1 Tab by mouth two (2) times a day.  180 Tab 3    simvastatin (ZOCOR) 40 mg tablet TAKE 1 TABLET BY MOUTH AT BEDTIME 90 Tab 1    sertraline (ZOLOFT) 50 mg tablet TAKE 1 TABLET BY MOUTH AT BEDTIME 30 Tab 5    fluocinoNIDE (LIDEX) 0.05 % topical cream Apply 0.5 g to affected area two (2) times a day. As needed 45 g 2    cholecalciferol (VITAMIN D3) (1000 Units /25 mcg) tablet Take 2,000 Units by mouth.  nitroglycerin (NITRODUR) 0.4 mg/hr APPLY 1 PATCH FOR 12 TO 14 HOURS DAILY       Allergies   Allergen Reactions    Codeine Hives    Lisinopril Angioedema       Family History   Problem Relation Age of Onset    Lung Disease Father    Jocelyn Arthritis-osteo Daughter      Social History     Tobacco Use    Smoking status: Former Smoker     Quit date: 2015     Years since quittin.0    Smokeless tobacco: Former User     Quit date: 3/4/2015   Substance Use Topics    Alcohol use: Yes     Alcohol/week: 3.0 standard drinks     Types: 3 Cans of beer per week       Evaristo Reyes, who was evaluated through a synchronous (real-time) audio only encounter, and/or her healthcare decision maker, is aware that it is a billable service, with coverage as determined by her insurance carrier. She provided verbal consent to proceed: Yes, and patient identification was verified. It was conducted pursuant to the emergency declaration under the [facility], [address] [place] [address] authority and the [employer] and [employer] Act. A caregiver was present when appropriate. Ability to conduct physical exam was limited. I was in the office. The patient was at home.     Raquel De Leon MD

## 2021-03-24 NOTE — PROGRESS NOTES
Chief Complaint   Patient presents with    Personal Problem     [facility]     Annual Wellness Visit       Patient is aware that this is a Virtual Visit or Phone Call Only doctor's visit. Patient has not been out of the country in (14 months), NO diarrhea, NO cough, NO chest conjestion, NO temp. Pt has not been around anyone with these symptoms. Health Maintenance reviewed. I have reviewed the patient's medical history in detail and updated the computerized patient record. 1. Have you been to the ER, urgent care clinic since your last visit? Hospitalized since your last visit?no    2. Have you seen or consulted any other health care providers outside of the [address] [place] [address] since your last visit? Include any pap smears or colon screening. no    Encouraged pt to discuss pt's wishes with spouse/partner/family and bring them in the next appt to follow thru with the Advanced Directive      Fall Risk Assessment, last 12 mths 3/24/2021   Able to walk? Yes   Fall in past 12 months? 0   Do you feel unsteady? 0   Are you worried about falling 0   Number of falls in past 12 months -   Fall with injury?  -       3 most recent PHQ Screens 3/24/2021   Little interest or pleasure in doing things Several days   Feeling down, depressed, irritable, or hopeless Several days   Total Score PHQ 2 2   Trouble falling or staying asleep, or sleeping too much -   Feeling tired or having little energy -   Poor appetite, weight loss, or overeating -   Feeling bad about yourself - or that you are a failure or have let yourself or your family down -   Trouble concentrating on things such as school, work, reading, or watching TV -   Moving or speaking so slowly that other people could have noticed; or the opposite being so fidgety that others notice -   Thoughts of being better off dead, or hurting yourself in some way -   PHQ 9 Score -   How difficult have these problems made it for you to do your work, take care of your home and get along with others -       Abuse Screening Questionnaire 3/24/2021   Do you ever feel afraid of your partner? N   Are you in a relationship with someone who physically or mentally threatens you? N   Is it safe for you to go home?  Y       ADL Assessment 3/24/2021   Feeding yourself No Help Needed   Getting from bed to chair No Help Needed   Getting dressed No Help Needed   Bathing or showering No Help Needed   Walk across the room (includes cane/walker) No Help Needed   Using the telphone No Help Needed   Taking your medications No Help Needed   Preparing meals No Help Needed   Managing money (expenses/bills) No Help Needed   Moderately strenuous housework (laundry) No Help Needed   Shopping for personal items (toiletries/medicines) No Help Needed   Shopping for groceries No Help Needed   Driving No Help Needed   Climbing a flight of stairs No Help Needed   Getting to places beyond walking distances No Help Needed

## 2021-03-24 NOTE — PATIENT INSTRUCTIONS
Medicare Wellness Visit, Female The best way to live healthy is to have a lifestyle where you eat a well-balanced diet, exercise regularly, limit alcohol use, and quit all forms of tobacco/nicotine, if applicable. Regular preventive services are another way to keep healthy. Preventive services (vaccines, screening tests, monitoring & exams) can help personalize your care plan, which helps you manage your own care. Screening tests can find health problems at the earliest stages, when they are easiest to treat. [facility] follows the current, evidence-based guidelines published by the [facility] Jude Granda ([facility]) when recommending preventive services for our patients. Because we follow these guidelines, sometimes recommendations change over time as research supports it. (For example, mammograms used to be recommended annually. Even though Medicare will still pay for an annual mammogram, the newer guidelines recommend a mammogram every two years for women of average risk). Of course, you and your doctor may decide to screen more often for some diseases, based on your risk and your co-morbidities (chronic disease you are already diagnosed with). Preventive services for you include: - Medicare offers their members a free annual wellness visit, which is time for you and your primary care provider to discuss and plan for your preventive service needs. Take advantage of this benefit every year! 
-All adults over the age of 72 should receive the recommended pneumonia vaccines. Current USPSTF guidelines recommend a series of two vaccines for the best pneumonia protection.  
-All adults should have a flu vaccine yearly and a tetanus vaccine every 10 years.  
-All adults age 48 and older should receive the shingles vaccines (series of two vaccines).      
-All adults age 38-68 who are overweight should have a diabetes screening test once every three years.  
-All adults born between [birth year] and 1967 should be screened once for Hepatitis C. 
-Other screening tests and preventive services for persons with diabetes include: an eye exam to screen for diabetic retinopathy, a kidney function test, a foot exam, and stricter control over your cholesterol.  
-Cardiovascular screening for adults with routine risk involves an electrocardiogram (ECG) at intervals determined by your doctor.  
-Colorectal cancer screenings should be done for adults age 54-65 with no increased risk factors for colorectal cancer. There are a number of acceptable methods of screening for this type of cancer. Each test has its own benefits and drawbacks. Discuss with your doctor what is most appropriate for you during your annual wellness visit. The different tests include: colonoscopy (considered the best screening method), a fecal occult blood test, a fecal DNA test, and sigmoidoscopy. 
 
-A bone mass density test is recommended when a woman turns 65 to screen for osteoporosis. This test is only recommended one time, as a screening. Some providers will use this same test as a disease monitoring tool if you already have osteoporosis. -Breast cancer screenings are recommended every other year for women of normal risk, age 54-69. 
-Cervical cancer screenings for women over age 72 are only recommended with certain risk factors. Here is a list of your current Health Maintenance items (your personalized list of preventive services) with a due date: 
Health Maintenance Due Topic Date Due  
 Hepatitis C Test  Never done  Cholesterol Test   Never done  COVID-19 Vaccine (1) Never done  Shingles Vaccine (1 of 2) Never done  Colorectal Screening  Never done  Mammogram  Never done  Bone Mineral Density   Never done  Pneumococcal Vaccine (2 of 2 - PPSV23) 01/31/2013

## 2021-03-26 ENCOUNTER — TELEPHONE (OUTPATIENT)
Dept: INTERNAL MEDICINE CLINIC | Age: 71
End: 2021-03-26

## 2021-03-26 DIAGNOSIS — R92.8 FOLLOW-UP EXAMINATION OF ABNORMAL MAMMOGRAM: Primary | ICD-10-CM

## 2021-03-26 NOTE — TELEPHONE ENCOUNTER
Rin amado secour scheduling; says that the diag code that Dr Del Cid Stands sent is not valid for her Medicare. She needs a diagnosis not just a screening code.        # [phone]

## 2021-03-30 ENCOUNTER — TELEPHONE (OUTPATIENT)
Dept: INTERNAL MEDICINE CLINIC | Age: 71
End: 2021-03-30

## 2021-03-30 DIAGNOSIS — N64.4 MASTODYNIA: ICD-10-CM

## 2021-03-30 DIAGNOSIS — Z12.31 ENCOUNTER FOR SCREENING MAMMOGRAM FOR MALIGNANT NEOPLASM OF BREAST: Primary | ICD-10-CM

## 2021-03-30 NOTE — TELEPHONE ENCOUNTER
[facility] cortez [facility] said she received the new order and it was still written wrong; needs to say \"bilateral diagnostic mammogram with a diagnosis of abnormal mammogram\"

## 2021-04-01 ENCOUNTER — VIRTUAL VISIT (OUTPATIENT)
Dept: INTERNAL MEDICINE CLINIC | Age: 71
End: 2021-04-01
Payer: MEDICARE

## 2021-04-01 DIAGNOSIS — F32.1 EPISODE OF MODERATE MAJOR DEPRESSION (HCC): ICD-10-CM

## 2021-04-01 DIAGNOSIS — M32.9 LUPUS (HCC): Primary | ICD-10-CM

## 2021-04-01 DIAGNOSIS — I10 ESSENTIAL HYPERTENSION: ICD-10-CM

## 2021-04-01 DIAGNOSIS — N64.4 BREAST PAIN, LEFT: ICD-10-CM

## 2021-04-01 DIAGNOSIS — Z95.0 PACEMAKER: ICD-10-CM

## 2021-04-01 PROCEDURE — 3017F COLORECTAL CA SCREEN DOC REV: CPT | Performed by: FAMILY MEDICINE

## 2021-04-01 PROCEDURE — 3288F FALL RISK ASSESSMENT DOCD: CPT | Performed by: FAMILY MEDICINE

## 2021-04-01 PROCEDURE — G9717 DOC PT DX DEP/BP F/U NT REQ: HCPCS | Performed by: FAMILY MEDICINE

## 2021-04-01 PROCEDURE — 1090F PRES/ABSN URINE INCON ASSESS: CPT | Performed by: FAMILY MEDICINE

## 2021-04-01 PROCEDURE — G9899 SCRN MAM PERF RSLTS DOC: HCPCS | Performed by: FAMILY MEDICINE

## 2021-04-01 PROCEDURE — G8427 DOCREV CUR MEDS BY ELIG CLIN: HCPCS | Performed by: FAMILY MEDICINE

## 2021-04-01 PROCEDURE — G8400 PT W/DXA NO RESULTS DOC: HCPCS | Performed by: FAMILY MEDICINE

## 2021-04-01 PROCEDURE — 1100F PTFALLS ASSESS-DOCD GE2>/YR: CPT | Performed by: FAMILY MEDICINE

## 2021-04-01 PROCEDURE — G8756 NO BP MEASURE DOC: HCPCS | Performed by: FAMILY MEDICINE

## 2021-04-01 PROCEDURE — 99214 OFFICE O/P EST MOD 30 MIN: CPT | Performed by: FAMILY MEDICINE

## 2021-04-01 PROCEDURE — G8419 CALC BMI OUT NRM PARAM NOF/U: HCPCS | Performed by: FAMILY MEDICINE

## 2021-04-01 PROCEDURE — G8536 NO DOC ELDER MAL SCRN: HCPCS | Performed by: FAMILY MEDICINE

## 2021-04-01 RX ORDER — DICLOFENAC SODIUM 50 MG/1
50 TABLET, DELAYED RELEASE ORAL 2 TIMES DAILY
Qty: 60 TAB | Refills: 2 | Status: SHIPPED | OUTPATIENT
Start: 2021-04-01 | End: 2022-01-05

## 2021-04-01 NOTE — PROGRESS NOTES
Subjective:     Amalia Melgar is a 66 y.o. female who presents for follow up of hypertension, hyperlipidemia, coronary artery disease, history of prior MI and and pacemaker. New concerns: genralized pain, but overall she's feeling better, we inc her AD last visit. Mood better. Didn't do labs. Diet and Lifestyle: nonsmoker    Cardiovascular ROS:   Reports taking blood pressure medications without side affects. No complaints of exertional chest pain, excessive shortness of breath or focal weakness. Minimal swelling in lower legs or dizziness with standing. Review of Systems, additional:  Pertinent items are noted in HPI. C/o joints hurting x yrs    Patient Active Problem List    Diagnosis Date Noted    [facility]    [facility]    Sick sinus syndrome ([facility] 2020    Essential hypertension 2020    Hyperlipidemia 2020    Lumbar stenosis 2020    Episode of moderate major depression ([facility] 2020    Arthritis of right knee 2020       Allergies   Allergen Reactions    Codeine Hives    Lisinopril Angioedema     Past Medical History:   Diagnosis Date    Arrhythmia     Arthritis     Autoimmune disease ([facility]     CAD (coronary artery disease)     Chronic pain     Depression     Hypertension     Stroke ([facility]      Social History     Tobacco Use    Smoking status: Former Smoker     Quit date: 2015     Years since quittin.0    Smokeless tobacco: Former User     Quit date: 3/4/2015   Substance Use Topics    Alcohol use: Yes     Alcohol/week: 3.0 standard drinks     Types: 3 Cans of beer per week                 Objective:     Physical exam significant for the following:     Sounds better  There were no vitals taken for this visit. WD WN female NAD    . Assessment/Plan:     hypertension stable. ICD-10-CM ICD-9-CM    1.  Lupus (HCC)  M32.9 710.0 diclofenac EC (VOLTAREN) 50 mg EC tablet   2. Breast pain, left  N64.4 611.71 REFERRAL TO CARDIOLOGY   3. Pacemaker  Z95.0 V45.01 REFERRAL TO CARDIOLOGY   4. Episode of moderate major depression (HCC)  F32.1 296.22    5. Essential hypertension  I10 401.9          Orders Placed This Encounter    REFERRAL TO CARDIOLOGY     Referral Priority:   Routine     Referral Type:   Consultation     Referral Reason:   Specialty Services Required     Referred to Provider:   Mely Cheng MD     Number of Visits Requested:   1    diclofenac EC (VOLTAREN) 50 mg EC tablet     Sig: Take 1 Tab by mouth two (2) times a day. Dispense:  60 Tab     Refill:  2     As needed for pain       Follow-up and Dispositions    · Return in about 6 weeks (around 5/13/2021). Randolph Wilde is a 66 y.o. female who was phone evaluated on 4/1/2021. Consent:  She and/or her healthcare decision maker is aware that this patient-initiated Telehealth encounter is a billable service, with coverage as determined by her insurance carrier. She is aware that she may receive a bill and has provided verbal consent to proceed: Yes    I was in the office while conducting this encounter. Assessment & Plan:   Diagnoses and all orders for this visit:    1. Lupus (HCC)  -     diclofenac EC (VOLTAREN) 50 mg EC tablet; Take 1 Tab by mouth two (2) times a day. 2. Breast pain, left  -     REFERRAL TO CARDIOLOGY    3. Pacemaker  -     REFERRAL TO CARDIOLOGY          Follow-up and Dispositions    · Return in about 6 weeks (around 5/13/2021). 712  Subjective:        As above    We discussed the expected course, resolution and complications of the diagnosis(es) in detail. Medication risks, benefits, costs, interactions, and alternatives were discussed as indicated. I advised her to contact the office if her condition worsens, changes or fails to improve as anticipated. She expressed understanding with the diagnosis(es) and plan.      Pursuant to the emergency declaration under the [facility], [facility] waiver authority and the [employer] Act, this Virtual  Visit was conducted, with patient's consent, to reduce the patient's risk of exposure to COVID-19 and provide continuity of care for an established patient. Services were provided through a video synchronous discussion virtually to substitute for in-person clinic visit.     Mikel Mitchell MD

## 2021-04-02 DIAGNOSIS — R92.8 ABNORMAL MAMMOGRAM: Primary | ICD-10-CM

## 2021-05-24 ENCOUNTER — TELEPHONE (OUTPATIENT)
Dept: INTERNAL MEDICINE CLINIC | Age: 71
End: 2021-05-24

## 2021-05-24 DIAGNOSIS — M81.0 AGE-RELATED OSTEOPOROSIS WITHOUT CURRENT PATHOLOGICAL FRACTURE: Primary | ICD-10-CM

## 2021-05-24 DIAGNOSIS — N64.4 BILATERAL MASTODYNIA: ICD-10-CM

## 2021-05-24 NOTE — TELEPHONE ENCOUNTER
RUBY in [facility] for a Diagnostic Mammogram     Pain in both breast    Appointment June 1, 2021    Order for a Bone density test for the same day

## 2021-05-27 ENCOUNTER — TELEPHONE (OUTPATIENT)
Dept: INTERNAL MEDICINE CLINIC | Age: 71
End: 2021-05-27

## 2021-05-27 NOTE — TELEPHONE ENCOUNTER
Message fr envera          Reason for call: PT CALLING REGARDING REFERRAL FOR MAMMOGRAM \"SPECIAL IMAGING\" AND BONE DENSITY AND HAS AN APPT 6/1.  PLEASE CALL BACK     Callback required yes/no and why:YES     Best contact number(s): [phone]

## 2021-07-16 ENCOUNTER — TELEPHONE (OUTPATIENT)
Dept: INTERNAL MEDICINE CLINIC | Age: 71
End: 2021-07-16

## 2021-07-28 DIAGNOSIS — R92.8 FOLLOW-UP EXAMINATION OF ABNORMAL MAMMOGRAM: ICD-10-CM

## 2021-08-16 DIAGNOSIS — I10 ESSENTIAL HYPERTENSION: ICD-10-CM

## 2021-08-18 RX ORDER — HYDROCHLOROTHIAZIDE 12.5 MG/1
TABLET ORAL
Qty: 90 TABLET | Refills: 1 | Status: SHIPPED | OUTPATIENT
Start: 2021-08-18 | End: 2022-02-16 | Stop reason: SDUPTHER

## 2021-08-27 DIAGNOSIS — M81.0 AGE-RELATED OSTEOPOROSIS WITHOUT CURRENT PATHOLOGICAL FRACTURE: ICD-10-CM

## 2021-08-30 RX ORDER — SIMVASTATIN 40 MG/1
TABLET, FILM COATED ORAL
Qty: 90 TABLET | Refills: 1 | Status: SHIPPED | OUTPATIENT
Start: 2021-08-30 | End: 2022-01-08

## 2021-09-13 DIAGNOSIS — F32.1 EPISODE OF MODERATE MAJOR DEPRESSION (HCC): ICD-10-CM

## 2021-09-13 RX ORDER — CLONIDINE HYDROCHLORIDE 0.2 MG/1
TABLET ORAL
Qty: 60 TABLET | Refills: 5 | Status: SHIPPED | OUTPATIENT
Start: 2021-09-13 | End: 2022-02-16 | Stop reason: SDUPTHER

## 2021-11-15 ENCOUNTER — OFFICE VISIT (OUTPATIENT)
Dept: FAMILY MEDICINE CLINIC | Age: 71
End: 2021-11-15
Payer: MEDICARE

## 2021-11-15 VITALS
TEMPERATURE: 97.5 F | OXYGEN SATURATION: 96 % | HEART RATE: 85 BPM | RESPIRATION RATE: 16 BRPM | HEIGHT: 65 IN | WEIGHT: 192 LBS | BODY MASS INDEX: 31.99 KG/M2 | DIASTOLIC BLOOD PRESSURE: 78 MMHG | SYSTOLIC BLOOD PRESSURE: 121 MMHG

## 2021-11-15 DIAGNOSIS — M54.50 CHRONIC BILATERAL LOW BACK PAIN WITHOUT SCIATICA: ICD-10-CM

## 2021-11-15 DIAGNOSIS — G89.29 CHRONIC BILATERAL LOW BACK PAIN WITHOUT SCIATICA: ICD-10-CM

## 2021-11-15 DIAGNOSIS — I49.5 SICK SINUS SYNDROME (HCC): ICD-10-CM

## 2021-11-15 DIAGNOSIS — N64.4 BREAST PAIN, RIGHT: Primary | ICD-10-CM

## 2021-11-15 DIAGNOSIS — H26.9 CATARACT, UNSPECIFIED CATARACT TYPE, UNSPECIFIED LATERALITY: ICD-10-CM

## 2021-11-15 DIAGNOSIS — I25.118 CORONARY ARTERY DISEASE OF NATIVE ARTERY OF NATIVE HEART WITH STABLE ANGINA PECTORIS (HCC): ICD-10-CM

## 2021-11-15 PROCEDURE — 99215 OFFICE O/P EST HI 40 MIN: CPT | Performed by: FAMILY MEDICINE

## 2021-11-15 NOTE — PROGRESS NOTES
Health Maintenance Due   Topic Date Due    Hepatitis C Screening  Never done    Lipid Screen  Never done    COVID-19 Vaccine (1) Never done    Colorectal Cancer Screening Combo  Never done    Shingrix Vaccine Age 50> (1 of 2) Never done    Pneumococcal 65+ years (1 of 1 - PPSV23) Never done    Flu Vaccine (1) 09/01/2021     Do you have an [address] in place in the event that you have a healthcare crisis that could impact your decision making as it pertains to your health? YES    Would you like information about Advance Care Planning? NO    Information given.  NO

## 2021-11-15 NOTE — PROGRESS NOTES
BERNARDINO  Chris Swanson is a 66 y.o. female who presents with her daughter as a new patient. Coming from [facility] care and previous patient of Zack Garcia and [facility]. She has a list of things that she feels like she needs which sounds like she has kept pent up. Got a laundry list today without being able to go in depth on any 1 thing. Relied heavily on the record    Reports that she has lupus but does not currently take any medicine for it. Reports that she has an upcoming appointment with Dr. Anibal Niño rheumatology. Is unclear who she is seen in the past.  She did not report any symptoms that would be attributable to lupus. States that she has intermittent back pain. Has had back pain for years. Was told that she has a problem at L1, L3 and L5. She has received injections ordered by Dr. Salina Calvert that she went all the way to [facility] for. She is unsure what these were called. It sounds like it might have been an interventional radiology procedure. Recalls getting 6 shots in 1 visit. Would get this done roughly once a year for about 10 years. Last one 3 years ago. Found a note from 2013 [facility] spine center referring to facet injections L4-L5 and L5-S1. They did arrange injections with interventional radiology. I see ancillary documentation from a facet injection 8/20/2015 right L4/L5 and right L5/S1 and another 1 on 11/15        She had a can fall off a shelf and onto her right breast years ago. Ever since then she perceives there is a knot there that will fluctuate in size. When it flares up she gets a \"special imaging\" test which based on her description sounds like a breast ultrasound. She is incensed that she has not been able to get this test in the last year. I see that diagnostic mammogram was ordered for breast pain. She felt like this was the wrong test.  Is pointing along the axillary tail of the breast when describing her complaint.     She had a right knee replacement done. She felt this was quite painful. She did not complete physical therapy. Sees [facility] electrophysiology for pacemaker. Cath at [facility] 2015    She is [facility] vascular for PAD. Was able to review that record. Had a external iliac artery angioplasty/stent in 2016. They also checked a cervical and thoracic myelogram in 2018     2015: Colonoscopy    [facility] rheumatology visit 2015 reports seronegative inflammatory arthritis with a history of chronic low-dose steroid. No mention of lupus    I perceive that she is having trouble navigating healthcare and is feeling generally frustrated but I was unable to drill down further. I found her to be a poor historian for certain aspects of her history. For instance she reports she had an emergency room visit earlier this year for a blood clot in her hand. Review of the record shows that she had a hematoma in the emergency room and there was no concern for blood clot or phlebitis. An ultrasound was ordered but she did not return for that. She blames this emergency room visit on medicine that she was prescribed and \"I just cannot take if I have lupus\" which I believe she is referring to diclofenac. PMHx:  Past Medical History:   Diagnosis Date    Arrhythmia     Arthritis     [facility] ([facility]     CAD (coronary artery disease)     Chronic pain     Depression     Hypertension     Stroke ([facility]        Meds:   Current Outpatient Medications   Medication Sig Dispense Refill    carvediloL (COREG) 25 mg tablet Take 1 Tablet by mouth daily. (Patient taking differently: Take 12.5 mg by mouth two (2) times daily (with meals). ) 90 Tablet 1    sertraline (ZOLOFT) 100 mg tablet Take 1 Tablet by mouth daily.  90 Tablet 1    cilostazoL (PLETAL) 50 mg tablet TAKE 1 TABLET BY MOUTH TWICE DAILY 180 Tablet 0    cloNIDine HCL (CATAPRES) 0.2 mg tablet TAKE 1 TABLET BY MOUTH TWICE DAILY 60 Tablet 5    simvastatin (ZOCOR) 40 mg tablet TAKE 1 TABLET BY MOUTH AT BEDTIME 90 Tablet 1    hydroCHLOROthiazide (HYDRODIURIL) 12.5 mg tablet TAKE 1 TABLET BY MOUTH EVERY DAY 90 Tablet 1    fluocinoNIDE (LIDEX) 0.05 % topical cream Apply 0.5 g to affected area two (2) times a day. As needed (Patient taking differently: Apply 0.5 g to affected area three (3) times daily. As needed) 45 g 2    cholecalciferol (VITAMIN D3) (1000 Units /25 mcg) tablet Take 2,000 Units by mouth.  nitroglycerin (NITRODUR) 0.4 mg/hr APPLY 1 PATCH FOR 12 TO 14 HOURS DAILY      diclofenac EC (VOLTAREN) 50 mg EC tablet Take 1 Tab by mouth two (2) times a day. (Patient not taking: Reported on 11/15/2021) 60 Tab 2       Allergies: Allergies   Allergen Reactions    Codeine Hives    Lisinopril Angioedema       Smoker:  Social History     Tobacco Use   Smoking Status Former Smoker    Quit date: 2015    Years since quittin.7   Smokeless Tobacco Former User    Quit date: 3/4/2015       ETOH:   Social History     Substance and Sexual Activity   Alcohol Use Yes    Alcohol/week: 3.0 standard drinks    Types: 3 Cans of beer per week       FH:   Family History   Problem Relation Age of Onset    Lung Disease Father     Arthritis-osteo Daughter        ROS:   As listed in HPI. In addition:  Constitutional:   No headache, fever, fatigue, weight loss or weight gain      Cardiac:    No chest pain      Resp:   No cough or shortness of breath      Neuro   No loss of consciousness, dizziness, seizures      Physical Exam:  Blood pressure 121/78, pulse 85, temperature 97.5 °F (36.4 °C), temperature source Temporal, resp. rate 16, height 5' 5\" (1.651 m), weight 192 lb (87.1 kg), SpO2 96 %. GEN: No apparent distress. Alert and oriented and responds to all questions appropriately. NEUROLOGIC:  No focal neurologic deficits. Strength and sensation grossly intact. Coordination and gait grossly intact. EXT: Well perfused. No edema. SKIN: No obvious rashes.   Lungs clear to auscultation bilaterally  CV regular rate rhythm no murmur  Back examined. There is midline tenderness L1-L5. No paraspinal tenderness. There is postural tenderness in the erector spinae and quadratus lumborum. These are the primary pain generators. Assessment and Plan     Cataracts  Was told she had cataracts by her optometrist.  She would like a referral to ophthalmology. Breast pain, right axillary tail  Diagnostic mammogram with ultrasound of the axilla. She would like to get this done at [address] [place] [address]. Back pain  Chronic  Would be amenable to physical therapy  She was getting facet injections right L4/L5 and L5/S1 last record of this is from 2015  She thinks her PCP used to order this. I would be inclined to refer her to orthopedics if she would like to consider injection    Sick sinus syndrome  Care of [address] [place] [address] cardiology Dr Manda Rashid     Dizzy/sweaty  Happening on a daily basis. Seems to happen while seated or shortly after standing. See mention as far back as 2015 in [facility] record. Do not see this mentioned in the cardiology notes but she reports she brought this up with cardiology. I think blood pressure medication could be playing a role but as of this first visit she is unwilling to change her medicines  Also possible she could be referring to anxiety \"the heat rises into my face and I get sweaty and then I need to sit still for a while\"    Rheumatism  Rheumatology note from [address] [place] [address] suggests \"seronegative inflammatory arthritis\" on low-dose chronic steroid stopped in 2015  Has an appointment to follow-up with [address] [place] [address] rheumatology. Unaware what if any symptoms she is having    This was a 40-minute visit. Greater than 50% of the time was spent counseling the patient on back pain, breast pain. ICD-10-CM ICD-9-CM    1. Breast pain, right  N64.4 611.71 JESSI MAMMO RT DX INCL CAD      US BREAST AXILLA RT   2. Coronary artery disease of native artery of native heart with stable angina pectoris ([facility]  I25.118 414.01      413.9    3.  Cataract, unspecified cataract type, unspecified laterality  H26.9 366.9 REFERRAL TO OPHTHALMOLOGY   4. Chronic bilateral low back pain without sciatica  M54.50 724.2     G89.29 338.29    5. Sick sinus syndrome (HCC)  I49.5 427.81        AVS given.  Pt expressed understanding of instructions

## 2021-11-16 ENCOUNTER — TELEPHONE (OUTPATIENT)
Dept: FAMILY MEDICINE CLINIC | Age: 71
End: 2021-11-16

## 2021-11-16 DIAGNOSIS — N64.4 BREAST PAIN: Primary | ICD-10-CM

## 2021-11-16 DIAGNOSIS — M48.062 SPINAL STENOSIS OF LUMBAR REGION WITH NEUROGENIC CLAUDICATION: Primary | ICD-10-CM

## 2021-11-16 NOTE — TELEPHONE ENCOUNTER
[facility] needs to have Dr Hilary Seymour order a diagnostic bilateral mammogram      Please fax to [phone]

## 2021-11-16 NOTE — TELEPHONE ENCOUNTER
This is not a procedure that I am comfortable ordering. Reviewing her [facility] record this was ordered by a [facility] spine surgeon. That is the correct doctor to order this type of injection. I do know who she can see to be evaluated for this type of injection. If she prefers [place] Dr. Aria Nolasco or Dr Jazmin Berg [phone]    If she prefers [facility] orthopedic and [facility] [phone].   I believe she has been there before

## 2021-11-16 NOTE — TELEPHONE ENCOUNTER
----- Message from Mario Davis sent at 11/16/2021  3:51 PM EST -----  Subject: Message to Provider    QUESTIONS  Information for Provider? patient has information about getting injections   in her back her last shot was nov 21 2018 suppose to get them every year. the name l3 and l4 transforaminal steroid shot the number for history of   her shot records is [id number] only the office can call and get the   information the shot is not regular   ---------------------------------------------------------------------------  --------------  CALL BACK INFO  What is the best way for the office to contact you? OK to leave message on   voicemail  Preferred Call Back Phone Number? [phone]  ---------------------------------------------------------------------------  --------------  SCRIPT ANSWERS  Relationship to Patient?  Self

## 2021-11-16 NOTE — TELEPHONE ENCOUNTER
Clarisse Crockett [facility] Nurse Pool  Subject: Message to Provider     QUESTIONS   Information for Provider? patient also needs a referral for the shots to   her back it has to sent over then the patient can call for appointment   ---------------------------------------------------------------------------   --------------   [address]   What is the best way for the office to contact you? OK to leave message on   voicemail   Preferred Call Back Phone Number? [phone]   ---------------------------------------------------------------------------   --------------   SCRIPT ANSWERS   Relationship to Patient?  Self

## 2021-11-17 ENCOUNTER — DOCUMENTATION ONLY (OUTPATIENT)
Dept: FAMILY MEDICINE CLINIC | Age: 71
End: 2021-11-17

## 2021-11-17 ENCOUNTER — TELEPHONE (OUTPATIENT)
Dept: FAMILY MEDICINE CLINIC | Age: 71
End: 2021-11-17

## 2021-11-17 ENCOUNTER — TRANSCRIBE ORDER (OUTPATIENT)
Dept: SCHEDULING | Age: 71
End: 2021-11-17

## 2021-11-17 DIAGNOSIS — N64.4 BREAST PAIN: Primary | ICD-10-CM

## 2021-11-17 NOTE — TELEPHONE ENCOUNTER
Ophthalmology referral faxed to [facility] and confirmation page recv'd.

## 2021-11-17 NOTE — TELEPHONE ENCOUNTER
Spoke with [facility] and they will need a referral.  Records will need to be faxed to [phone] to [address] [place] [address]. They will contact the patient within 14 days to schedule an appt.

## 2021-11-17 NOTE — TELEPHONE ENCOUNTER
Informed Central Scheduling to remove pt from call list, since she prefers [facility] location.

## 2021-11-17 NOTE — TELEPHONE ENCOUNTER
/telephone  Received:  Today  Pennie Sullivan [facility] [employer]  Phone Number:  [phone] (Call me)     General Message/Vendor Calls     Caller's first and last name: [address]       Reason for call: Order for mammogram and ultrasound are not correct       Callback required yes/no and why: Yes to correct order       Best contact number(s): [phone]       Details to clarify the request: Fax: [phone]       Edith Robison

## 2021-11-17 NOTE — PROGRESS NOTES
Referral, demographics, insurance, and office note faxed to [phone] and confirmation page recv'd.

## 2021-12-03 ENCOUNTER — TELEPHONE (OUTPATIENT)
Dept: FAMILY MEDICINE CLINIC | Age: 71
End: 2021-12-03

## 2021-12-03 DIAGNOSIS — Z12.31 ENCOUNTER FOR SCREENING MAMMOGRAM FOR MALIGNANT NEOPLASM OF BREAST: Primary | ICD-10-CM

## 2021-12-03 NOTE — TELEPHONE ENCOUNTER
Mammogram referral faxed to [facility] @ [phone] and confirmation page recv'd. Pt has been notified that the pain management referral was faxed on 11/17/2021. She states that she had recv'd a call from them and did not answer. She will call them back. Phone # given for ophthalmology.

## 2021-12-03 NOTE — TELEPHONE ENCOUNTER
----- Message from Paramjit Liriano sent at 12/2/2021  1:42 PM EST -----  Subject: Referral Request    QUESTIONS   Reason for referral request? Back injections   Has the physician seen you for this condition before? No   Preferred Specialist (if applicable)? Do you already have an appointment scheduled? Additional Information for Provider? Pt would like for her referral to be   sent to [facility]. ---------------------------------------------------------------------------  --------------  Genia PLATT  What is the best way for the office to contact you? Do not leave any   message, patient will call back for answer  Preferred Call Back Phone Number?  [phone]

## 2021-12-03 NOTE — TELEPHONE ENCOUNTER
----- Message from Tamika Xavier sent at 12/2/2021  2:08 PM EST -----  Subject: Referral Request    QUESTIONS   Reason for referral request? mammogram special imaging   Has the physician seen you for this condition before? No   Preferred Specialist (if applicable)? Do you already have an appointment scheduled? No  Additional Information for Provider? [facility]   ---------------------------------------------------------------------------  --------------  Radha PLATT  What is the best way for the office to contact you? OK to leave message on   voicemail  Preferred Call Back Phone Number?  [phone]

## 2021-12-03 NOTE — TELEPHONE ENCOUNTER
----- Message from Edilia Robledo sent at 12/2/2021  2:08 PM EST -----  Subject: Referral Request    QUESTIONS   Reason for referral request? mammogram special imaging   Has the physician seen you for this condition before? No   Preferred Specialist (if applicable)? Do you already have an appointment scheduled? No  Additional Information for Provider? [facility]   ---------------------------------------------------------------------------  --------------  Megan PLATT  What is the best way for the office to contact you? OK to leave message on   voicemail  Preferred Call Back Phone Number?  [phone]

## 2021-12-03 NOTE — TELEPHONE ENCOUNTER
Received several phone calls about referral requests. She would like a 3D mammogram.  I ordered this. I am not sure that [facility] offers this service and she should check with her insurance company to make sure that this is going to be covered or be prepared to pay out-of-pocket. She requested a referral for back injection. We already placed this referral.  [facility] [phone] These are the doctors that have been arranging her back injections. She requested a referral for ophthalmology for cataracts.  Dr Jed Pelletier [phone]

## 2021-12-03 NOTE — TELEPHONE ENCOUNTER
----- Message from Ana Diop sent at 12/2/2021  1:42 PM EST -----  Subject: Referral Request    QUESTIONS   Reason for referral request? Eye dr- cataracts   Has the physician seen you for this condition before? No   Preferred Specialist (if applicable)? Do you already have an appointment scheduled? Additional Information for Provider?   ---------------------------------------------------------------------------  --------------  CALL BACK INFO  What is the best way for the office to contact you? Do not leave any   message, patient will call back for answer  Preferred Call Back Phone Number?  [phone]

## 2021-12-07 ENCOUNTER — TELEPHONE (OUTPATIENT)
Dept: FAMILY MEDICINE CLINIC | Age: 71
End: 2021-12-07

## 2021-12-07 NOTE — TELEPHONE ENCOUNTER
Was there a problem with the ophthalmologist number we gave her last week? Dr. Nereida Tobar?   As far as I know is the [address] in [place].  I believe the number is [phone]    [address] [place] [address]

## 2021-12-07 NOTE — TELEPHONE ENCOUNTER
----- Message from Raleigh Buchanan sent at 12/7/2021  3:24 PM EST -----  Subject: Referral Request    QUESTIONS   Reason for referral request? Patient is requesting referral to eye doctor   in your network . Has the physician seen you for this condition before? Yes  Select a date? 2021-11-15  Select the Provider the patient wants to be referred to, if known (PCP or   Specialist)? Amy Prajapati   Preferred Specialist (if applicable)? Do you already have an appointment scheduled? Yes  Select Scheduled Date? 2021-12-15  Select Scheduled Physician? Amy Prajapati   Additional Information for Provider? Patient confirm if she need   appointment on 12/15/2021 follow up for blood up or wait until she has all   of Dr. Tony Arbovale referral done.   ---------------------------------------------------------------------------  --------------  [address]  What is the best way for the office to contact you? OK to leave message on   voicemail  Preferred Call Back Phone Number?  [phone]

## 2021-12-08 ENCOUNTER — TELEPHONE (OUTPATIENT)
Dept: FAMILY MEDICINE CLINIC | Age: 71
End: 2021-12-08

## 2021-12-08 NOTE — TELEPHONE ENCOUNTER
----- Message from Anita Margarita sent at 12/7/2021  4:34 PM EST -----  Subject: Referral Request    QUESTIONS   Reason for referral request? PT called in regards to needing a referral   for [facility] for cataracts. Phone number [phone] fax number   [phone]. PTs insurance covers [facility]. Has the physician seen you for this condition before? No   Preferred Specialist (if applicable)? Do you already have an appointment scheduled? No  Additional Information for Provider?   ---------------------------------------------------------------------------  --------------  CALL BACK INFO  What is the best way for the office to contact you? OK to leave message on   voicemail  Preferred Call Back Phone Number?  [phone]

## 2021-12-08 NOTE — TELEPHONE ENCOUNTER
----- Message from Victor Manuel Srivastava sent at 12/7/2021  4:34 PM EST -----  Subject: Referral Request    QUESTIONS   Reason for referral request? PT called in regards to needing a referral   for [facility] for cataracts. Phone number [phone] fax number   [phone]. PTs insurance covers [facility]. Has the physician seen you for this condition before? No   Preferred Specialist (if applicable)? Do you already have an appointment scheduled? No  Additional Information for Provider?   ---------------------------------------------------------------------------  --------------  CALL BACK INFO  What is the best way for the office to contact you? OK to leave message on   voicemail  Preferred Call Back Phone Number?  [phone]

## 2021-12-29 ENCOUNTER — OFFICE VISIT (OUTPATIENT)
Dept: FAMILY MEDICINE CLINIC | Age: 71
End: 2021-12-29
Payer: MEDICARE

## 2021-12-29 VITALS
TEMPERATURE: 97.6 F | HEART RATE: 89 BPM | WEIGHT: 182.6 LBS | SYSTOLIC BLOOD PRESSURE: 121 MMHG | OXYGEN SATURATION: 97 % | BODY MASS INDEX: 30.42 KG/M2 | RESPIRATION RATE: 16 BRPM | HEIGHT: 65 IN | DIASTOLIC BLOOD PRESSURE: 79 MMHG

## 2021-12-29 DIAGNOSIS — M54.50 CHRONIC LOW BACK PAIN, UNSPECIFIED BACK PAIN LATERALITY, UNSPECIFIED WHETHER SCIATICA PRESENT: Primary | ICD-10-CM

## 2021-12-29 DIAGNOSIS — Z01.811 PRE-OP CHEST EXAM: ICD-10-CM

## 2021-12-29 DIAGNOSIS — G89.29 CHRONIC LOW BACK PAIN, UNSPECIFIED BACK PAIN LATERALITY, UNSPECIFIED WHETHER SCIATICA PRESENT: Primary | ICD-10-CM

## 2021-12-29 PROCEDURE — 1090F PRES/ABSN URINE INCON ASSESS: CPT | Performed by: FAMILY MEDICINE

## 2021-12-29 PROCEDURE — G8399 PT W/DXA RESULTS DOCUMENT: HCPCS | Performed by: FAMILY MEDICINE

## 2021-12-29 PROCEDURE — G8536 NO DOC ELDER MAL SCRN: HCPCS | Performed by: FAMILY MEDICINE

## 2021-12-29 PROCEDURE — G8752 SYS BP LESS 140: HCPCS | Performed by: FAMILY MEDICINE

## 2021-12-29 PROCEDURE — G8427 DOCREV CUR MEDS BY ELIG CLIN: HCPCS | Performed by: FAMILY MEDICINE

## 2021-12-29 PROCEDURE — 3017F COLORECTAL CA SCREEN DOC REV: CPT | Performed by: FAMILY MEDICINE

## 2021-12-29 PROCEDURE — 99214 OFFICE O/P EST MOD 30 MIN: CPT | Performed by: FAMILY MEDICINE

## 2021-12-29 PROCEDURE — 1101F PT FALLS ASSESS-DOCD LE1/YR: CPT | Performed by: FAMILY MEDICINE

## 2021-12-29 PROCEDURE — G8754 DIAS BP LESS 90: HCPCS | Performed by: FAMILY MEDICINE

## 2021-12-29 PROCEDURE — G8417 CALC BMI ABV UP PARAM F/U: HCPCS | Performed by: FAMILY MEDICINE

## 2021-12-29 PROCEDURE — G9899 SCRN MAM PERF RSLTS DOC: HCPCS | Performed by: FAMILY MEDICINE

## 2021-12-29 PROCEDURE — G9717 DOC PT DX DEP/BP F/U NT REQ: HCPCS | Performed by: FAMILY MEDICINE

## 2021-12-29 NOTE — PROGRESS NOTES
BERNARDINO  Archana Greenfield is a 66 y.o. female who presents for preop cataract surgery. Has not had a problem with anesthesia in the past.  She is looking forward to this procedure. She still having back pain and has not made an appointment to see the orthopedist yet. She has trouble navigating healthcare and did not realize that she needed to call to make an appointment. I provided her with the phone number again    PMHx:  Past Medical History:   Diagnosis Date    Arrhythmia     Arthritis     Auto[facility] disease ([facility]     CAD (coronary artery disease)     Chronic pain     Depression     Hypertension     Stroke ([facility]        Meds:   Current Outpatient Medications   Medication Sig Dispense Refill    carvediloL (COREG) 25 mg tablet Take 1 Tablet by mouth daily. (Patient taking differently: Take 12.5 mg by mouth two (2) times daily (with meals). ) 90 Tablet 1    sertraline (ZOLOFT) 100 mg tablet Take 1 Tablet by mouth daily. 90 Tablet 1    cilostazoL (PLETAL) 50 mg tablet TAKE 1 TABLET BY MOUTH TWICE DAILY 180 Tablet 0    cloNIDine HCL (CATAPRES) 0.2 mg tablet TAKE 1 TABLET BY MOUTH TWICE DAILY 60 Tablet 5    simvastatin (ZOCOR) 40 mg tablet TAKE 1 TABLET BY MOUTH AT BEDTIME 90 Tablet 1    hydroCHLOROthiazide (HYDRODIURIL) 12.5 mg tablet TAKE 1 TABLET BY MOUTH EVERY DAY 90 Tablet 1    fluocinoNIDE (LIDEX) 0.05 % topical cream Apply 0.5 g to affected area two (2) times a day. As needed (Patient taking differently: Apply 0.5 g to affected area three (3) times daily. As needed) 45 g 2    cholecalciferol (VITAMIN D3) (1000 Units /25 mcg) tablet Take 2,000 Units by mouth.  nitroglycerin (NITRODUR) 0.4 mg/hr APPLY 1 PATCH FOR 12 TO 14 HOURS DAILY      diclofenac EC (VOLTAREN) 50 mg EC tablet Take 1 Tab by mouth two (2) times a day. (Patient not taking: Reported on 11/15/2021) 60 Tab 2       Allergies:    Allergies   Allergen Reactions    Adhesive Other (comments)     Skin removal    Lisinopril Angioedema  Codeine Other (comments)     Pt states that she has no allergies to Codeine       Smoker:  Social History     Tobacco Use   Smoking Status Former Smoker    Quit date: 2015    Years since quittin.8   Smokeless Tobacco Former User    Quit date: 3/4/2015       ETOH:   Social History     Substance and Sexual Activity   Alcohol Use Yes    Alcohol/week: 3.0 standard drinks    Types: 3 Cans of beer per week       FH:   Family History   Problem Relation Age of Onset    Lung Disease Father     OSTEOARTHRITIS Daughter        ROS:   As listed in HPI. In addition:  Constitutional:   No headache, fever, fatigue, weight loss or weight gain      Cardiac:    No chest pain      Resp:   No cough or shortness of breath      Neuro   No loss of consciousness, dizziness, seizures      Physical Exam:  Blood pressure 121/79, pulse 89, temperature 97.6 °F (36.4 °C), temperature source Temporal, resp. rate 16, height 5' 5\" (1.651 m), weight 182 lb 9.6 oz (82.8 kg), SpO2 97 %. GEN: No apparent distress. Alert and oriented and responds to all questions appropriately. NEUROLOGIC:  No focal neurologic deficits. Strength and sensation grossly intact. Coordination and gait grossly intact. EXT: Well perfused. No edema. SKIN: No obvious rashes. Lungs clear to auscultation bilaterally  CV regular rate rhythm no murmur       Assessment and Plan     Preop cataract  Capable of greater than 4 METs  Should be low risk for procedure. Low back pain  Refer to the [facility] at [facility]. In the past she has seen these doctors and when appropriate has had injections arranged through them. She specifically wants to go to their [facility] location to get injections      ICD-10-CM ICD-9-CM    1. Chronic low back pain, unspecified back pain laterality, unspecified whether sciatica present  M54.50 724.2 REFERRAL TO PAIN MANAGEMENT    G89.29 338.29    2. Pre-op chest exam  Z01.811 V72.82        AVS given.  Pt expressed understanding of instructions

## 2021-12-29 NOTE — PROGRESS NOTES
1. Have you been to the ER, urgent care clinic since your last visit? Hospitalized since your last visit? No    2. Have you seen or consulted any other health care providers outside of the [address] [place] [address] since your last visit? Include any pap smears or colon screening. Yes. Ophthalmology    Health Maintenance Due   Topic Date Due    Hepatitis C Screening  Never done    COVID-19 Vaccine (1) Never done    Lipid Screen  Never done    Colorectal Cancer Screening Combo  Never done    Shingrix Vaccine Age 50> (1 of 2) Never done    Pneumococcal 65+ years (1 of 1 - PPSV23) Never done    Flu Vaccine (1) 09/01/2021     Do you have an [address] in place in the event that you have a healthcare crisis that could impact your decision making as it pertains to your health? NO    Would you like information about Advance Care Planning? NO    Information given.  NO

## 2022-01-05 RX ORDER — ASPIRIN 81 MG/1
81 TABLET ORAL DAILY
COMMUNITY

## 2022-01-05 NOTE — PERIOP NOTES
[facility]  Ambulatory Surgery Unit  Pre-operative Instructions    Surgery/Procedure Date  1/17          Tentative Arrival Time TBD      1. On the day of your surgery/procedure, please report to the Ambulatory Surgery Unit Registration Desk and sign in at your designated time. The Ambulatory Surgery Unit is located in [facility] on the [facility] side of the [facility] across from the [address] [place] [address]. Please have all of your health insurance cards and a photo ID. 2. You must have someone with you to drive you home, as you should not drive a car for 24 hours following anesthesia. Please make arrangements for a responsible adult friend or family member to stay with you for at least the first 24 hours after your surgery. 3. Do not have anything to eat or drink (including water, gum, mints, coffee, juice) after 11:59 PM  1/16. This may not apply to medications prescribed by your physician. (Please note below the special instructions with medications to take the morning of surgery, if applicable.)    4. We recommend you do not drink any alcoholic beverages for 24 hours before and after your surgery. 5. Contact your surgeons office for instructions on the following medications: non-steroidal anti-inflammatory drugs (i.e. Advil, Aleve), vitamins, and supplements. (Some surgeons will want you to stop these medications prior to surgery and others may allow you to take them)   **If you are currently taking Plavix, Coumadin, Aspirin and/or other blood-thinning agents, contact your surgeon for instructions. ** Your surgeon will partner with the physician prescribing these medications to determine if it is safe to stop or if you need to continue taking. Please do not stop taking these medications without instructions from your surgeon.     6. In an effort to help prevent surgical site infection, we ask that you shower with an anti-bacterial soap (i.e. Dial/Safeguard, or the soap provided to you at your preadmission testing appointment) for 3 days prior to and on the morning of surgery, using a fresh towel after each shower. (Please begin this process with fresh bed linens.) Do not apply any lotions, powders, or deodorants after the shower on the day of your procedure. If applicable, please do not shave the operative site for 48 hours prior to surgery. 7. Wear comfortable clothes. Wear glasses instead of contacts. Do not bring any jewelry or money (other than copays or fees as instructed). Do not wear make-up, particularly mascara, the morning of your surgery. Do not wear nail polish, particularly if you are having foot /hand surgery. Wear your hair loose or down, no ponytails, buns, lianne pins or clips. All body piercings must be removed. 8. You should understand that if you do not follow these instructions your surgery may be cancelled. If your physical condition changes (i.e. fever, cold or flu) please contact your surgeon as soon as possible. 9. It is important that you be on time. If a situation occurs where you may be late, or if you have any questions or problems, please call [phone].    10. Your surgery time may be subject to change. You will receive a phone call the day prior to surgery to confirm your arrival time. Special Instructions: Take all medications and inhalers, as prescribed, on the morning of surgery with a sip of water. I understand a pre-operative phone call will be made to verify my surgery time. In the event that I am not available, I give permission for a message to be left on my answering service and/or with another person?       yes    Reviewed instructions to patient and given COVID testing date, able to verbalize understanding.     ___________________      ___________________      ________________    (Signature of Patient)          (Witness)                   (Date and Time)

## 2022-01-08 RX ORDER — CILOSTAZOL 50 MG/1
TABLET ORAL
Qty: 180 TABLET | Refills: 0 | Status: SHIPPED | OUTPATIENT
Start: 2022-01-08 | End: 2022-04-15 | Stop reason: SDUPTHER

## 2022-01-08 RX ORDER — SIMVASTATIN 40 MG/1
TABLET, FILM COATED ORAL
Qty: 90 TABLET | Refills: 1 | Status: SHIPPED | OUTPATIENT
Start: 2022-01-08 | End: 2022-07-15 | Stop reason: SDUPTHER

## 2022-01-13 ENCOUNTER — ANESTHESIA EVENT (OUTPATIENT)
Dept: SURGERY | Age: 72
End: 2022-01-13
Payer: MEDICAID

## 2022-01-13 ENCOUNTER — HOSPITAL ENCOUNTER (OUTPATIENT)
Dept: PREADMISSION TESTING | Age: 72
Discharge: HOME | End: 2022-01-13
Payer: MEDICARE

## 2022-01-13 LAB
SARS-COV-2, XPLCVT: DETECTED
SOURCE, COVRS: ABNORMAL

## 2022-01-13 PROCEDURE — U0005 INFEC AGEN DETEC AMPLI PROBE: HCPCS

## 2022-01-17 ENCOUNTER — ANESTHESIA (OUTPATIENT)
Dept: SURGERY | Age: 72
End: 2022-01-17
Payer: MEDICAID

## 2022-02-10 DIAGNOSIS — I10 ESSENTIAL HYPERTENSION: ICD-10-CM

## 2022-02-10 RX ORDER — HYDROCHLOROTHIAZIDE 12.5 MG/1
TABLET ORAL
Qty: 90 TABLET | Refills: 1 | OUTPATIENT
Start: 2022-02-10

## 2022-02-10 NOTE — PERIOP NOTES
[facility]  Ambulatory Surgery Unit  Pre-operative Instructions    Surgery/Procedure Date  2/21            Tentative Arrival Time TBD      1. On the day of your surgery/procedure, please report to the Ambulatory Surgery Unit Registration Desk and sign in at your designated time. The Ambulatory Surgery Unit is located in [facility] on the [facility] side of the [facility] across from the [address] [place] [address]. Please have all of your health insurance cards and a photo ID. **Due to current COVID restrictions, only ONE adult may accompany you the day of the procedure. We have limited seating available. If our waiting room is at capacity, your ride may be asked to remain in their vehicle. No children are allowed in the waiting room. 2. You must have someone with you to drive you home, as you should not drive a car for 24 hours following anesthesia. Please make arrangements for a responsible adult friend or family member to stay with you for at least the first 24 hours after your surgery. 3. Do not have anything to eat or drink (including water, gum, mints, coffee, juice) after 11:59 PM  2/20. This may not apply to medications prescribed by your physician. (Please note below the special instructions with medications to take the morning of surgery, if applicable.)    4. We recommend you do not drink any alcoholic beverages for 24 hours before and after your surgery. 5. Contact your surgeons office for instructions on the following medications: non-steroidal anti-inflammatory drugs (i.e. Advil, Aleve), vitamins, and supplements. (Some surgeons will want you to stop these medications prior to surgery and others may allow you to take them)   **If you are currently taking Plavix, Coumadin, Aspirin and/or other blood-thinning agents, contact your surgeon for instructions. ** Your surgeon will partner with the physician prescribing these medications to determine if it is safe to stop or if you need to continue taking. Please do not stop taking these medications without instructions from your surgeon. 6. In an effort to help prevent surgical site infection, we ask that you shower with an anti-bacterial soap (i.e. Dial/Safeguard, or the soap provided to you at your preadmission testing appointment) for 3 days prior to and on the morning of surgery, using a fresh towel after each shower. (Please begin this process with fresh bed linens.) Do not apply any lotions, powders, or deodorants after the shower on the day of your procedure. If applicable, please do not shave the operative site for 48 hours prior to surgery. 7. Wear comfortable clothes. Wear glasses instead of contacts. Do not bring any jewelry or money (other than copays or fees as instructed). Do not wear make-up, particularly mascara, the morning of your surgery. Do not wear nail polish, particularly if you are having foot /hand surgery. Wear your hair loose or down, no ponytails, buns, lianne pins or clips. All body piercings must be removed. 8. You should understand that if you do not follow these instructions your surgery may be cancelled. If your physical condition changes (i.e. fever, cold or flu) please contact your surgeon as soon as possible. 9. It is important that you be on time. If a situation occurs where you may be late, or if you have any questions or problems, please call [phone].    10. Your surgery time may be subject to change. You will receive a phone call the day prior to surgery to confirm your arrival time. Special Instructions: Take all medications and inhalers, as prescribed, on the morning of surgery with a sip of water    I understand a pre-operative phone call will be made to verify my surgery time. In the event that I am not available, I give permission for a message to be left on my answering service and/or with another person?       yes    Reviewed by phone with pt, verbalized understanding.   ___________________      ___________________      ________________  (Signature of Patient)          (Witness)                   (Date and Time)

## 2022-02-16 DIAGNOSIS — F32.1 EPISODE OF MODERATE MAJOR DEPRESSION (HCC): ICD-10-CM

## 2022-02-16 DIAGNOSIS — I10 ESSENTIAL HYPERTENSION: ICD-10-CM

## 2022-02-16 NOTE — TELEPHONE ENCOUNTER
Requested Prescriptions     Pending Prescriptions Disp Refills    cloNIDine HCL (CATAPRES) 0.2 mg tablet 60 Tablet 5     Sig: Take 1 Tablet by mouth two (2) times a day.      Last office visit: 12/29/2021

## 2022-02-16 NOTE — TELEPHONE ENCOUNTER
Requested Prescriptions     Pending Prescriptions Disp Refills    hydroCHLOROthiazide (HYDRODIURIL) 12.5 mg tablet 90 Tablet 1     Sig: Take 1 Tablet by mouth daily.      Last office visit: 12/29/2021

## 2022-02-16 NOTE — TELEPHONE ENCOUNTER
----- Message from Vero Petersen sent at 2/11/2022  1:31 PM EST -----  Subject: Refill Request    QUESTIONS  Name of Medication? cloNIDine HCL (CATAPRES) 0.2 mg tablet  Patient-reported dosage and instructions? 0.2mg once twice a day  How many days do you have left? 6  Preferred [facility]acy? [facility]  Pharmacy phone number (if available)? [phone]  Additional Information for Provider? about to run out refills from out   prescriber, need new one sent to pharmacy  ---------------------------------------------------------------------------  --------------  [address]  What is the best way for the office to contact you? OK to leave message on   voicemail  Preferred Call Back Phone Number?  [phone]

## 2022-02-16 NOTE — TELEPHONE ENCOUNTER
----- Message from Ashely Roe sent at 2/11/2022  1:30 PM EST -----  Subject: Refill Request    QUESTIONS  Name of Medication? hydroCHLOROthiazide (HYDRODIURIL) 12.5 mg tablet  Patient-reported dosage and instructions? 12.5mg once a day  How many days do you have left? 4  Preferred Pharmacy? [facility]  Pharmacy phone number (if available)? [phone]  Additional Information for Provider? 90 day refill. needs new prescription   sent to pharmacy  ---------------------------------------------------------------------------  --------------  [address]  What is the best way for the office to contact you? OK to leave message on   voicemail  Preferred Call Back Phone Number?  [phone]

## 2022-02-17 RX ORDER — HYDROCHLOROTHIAZIDE 12.5 MG/1
12.5 TABLET ORAL DAILY
Qty: 90 TABLET | Refills: 1 | Status: SHIPPED | OUTPATIENT
Start: 2022-02-17 | End: 2022-06-06

## 2022-02-17 RX ORDER — CLONIDINE HYDROCHLORIDE 0.2 MG/1
0.2 TABLET ORAL 2 TIMES DAILY
Qty: 60 TABLET | Refills: 5 | Status: SHIPPED | OUTPATIENT
Start: 2022-02-17 | End: 2022-06-06

## 2022-02-21 ENCOUNTER — HOSPITAL ENCOUNTER (OUTPATIENT)
Age: 72
Setting detail: OUTPATIENT SURGERY
Discharge: HOME | End: 2022-02-21
Attending: OPHTHALMOLOGY | Admitting: OPHTHALMOLOGY
Payer: MEDICAID

## 2022-02-21 VITALS
SYSTOLIC BLOOD PRESSURE: 121 MMHG | DIASTOLIC BLOOD PRESSURE: 65 MMHG | WEIGHT: 176 LBS | TEMPERATURE: 98 F | HEART RATE: 71 BPM | RESPIRATION RATE: 14 BRPM | HEIGHT: 65 IN | OXYGEN SATURATION: 96 % | BODY MASS INDEX: 29.32 KG/M2

## 2022-02-21 PROCEDURE — 77030021352 HC CBL LD SYS DISP COVD -B: Performed by: OPHTHALMOLOGY

## 2022-02-21 PROCEDURE — V2632 POST CHMBR INTRAOCULAR LENS: HCPCS | Performed by: OPHTHALMOLOGY

## 2022-02-21 PROCEDURE — 74011000250 HC RX REV CODE- 250

## 2022-02-21 PROCEDURE — 74011250636 HC RX REV CODE- 250/636: Performed by: NURSE ANESTHETIST, CERTIFIED REGISTERED

## 2022-02-21 PROCEDURE — 76030000000 HC AMB SURG OR TIME 0.5 TO 1: Performed by: OPHTHALMOLOGY

## 2022-02-21 PROCEDURE — 76210000050 HC AMBSU PH II REC 0.5 TO 1 HR: Performed by: OPHTHALMOLOGY

## 2022-02-21 PROCEDURE — 77030003029 HC SUT VCRL J&J -B: Performed by: OPHTHALMOLOGY

## 2022-02-21 PROCEDURE — 74011250636 HC RX REV CODE- 250/636: Performed by: OPHTHALMOLOGY

## 2022-02-21 PROCEDURE — 76060000061 HC AMB SURG ANES 0.5 TO 1 HR: Performed by: OPHTHALMOLOGY

## 2022-02-21 PROCEDURE — C1783 OCULAR IMP, AQUEOUS DRAIN DE: HCPCS | Performed by: OPHTHALMOLOGY

## 2022-02-21 PROCEDURE — 74011000250 HC RX REV CODE- 250: Performed by: OPHTHALMOLOGY

## 2022-02-21 PROCEDURE — 2709999900 HC NON-CHARGEABLE SUPPLY: Performed by: OPHTHALMOLOGY

## 2022-02-21 DEVICE — ACRYSOF(R) IQ ASPHERIC NATURAL IOL, SINGLE-PIECE ACRYLIC FOLDABLE PCL, UV WITH BLUE LIGHTFILTER, 13.0MM LENGTH, 6.0MM ANTERIORASYMMETRIC BICONVEX OPTIC, PLANAR HAPTICS.
Type: IMPLANTABLE DEVICE | Site: EYE | Status: FUNCTIONAL
Brand: ACRYSOF®

## 2022-02-21 RX ORDER — SODIUM CHLORIDE 0.9 % (FLUSH) 0.9 %
5-40 SYRINGE (ML) INJECTION EVERY 8 HOURS
Status: DISCONTINUED | OUTPATIENT
Start: 2022-02-21 | End: 2022-02-21 | Stop reason: HOSPADM

## 2022-02-21 RX ORDER — FENTANYL CITRATE 50 UG/ML
INJECTION, SOLUTION INTRAMUSCULAR; INTRAVENOUS AS NEEDED
Status: DISCONTINUED | OUTPATIENT
Start: 2022-02-21 | End: 2022-02-21 | Stop reason: HOSPADM

## 2022-02-21 RX ORDER — SODIUM CHLORIDE, SODIUM LACTATE, POTASSIUM CHLORIDE, CALCIUM CHLORIDE 600; 310; 30; 20 MG/100ML; MG/100ML; MG/100ML; MG/100ML
25 INJECTION, SOLUTION INTRAVENOUS CONTINUOUS
Status: DISCONTINUED | OUTPATIENT
Start: 2022-02-21 | End: 2022-02-21 | Stop reason: HOSPADM

## 2022-02-21 RX ORDER — SODIUM CHLORIDE 9 MG/ML
25 INJECTION, SOLUTION INTRAVENOUS CONTINUOUS
Status: DISCONTINUED | OUTPATIENT
Start: 2022-02-21 | End: 2022-02-21 | Stop reason: HOSPADM

## 2022-02-21 RX ORDER — TIMOLOL MALEATE 5 MG/ML
SOLUTION/ DROPS OPHTHALMIC AS NEEDED
Status: DISCONTINUED | OUTPATIENT
Start: 2022-02-21 | End: 2022-02-21 | Stop reason: HOSPADM

## 2022-02-21 RX ORDER — CIPROFLOXACIN HYDROCHLORIDE 3.5 MG/ML
1 SOLUTION/ DROPS TOPICAL
Status: DISCONTINUED | OUTPATIENT
Start: 2022-02-21 | End: 2022-02-21 | Stop reason: HOSPADM

## 2022-02-21 RX ORDER — NEOMYCIN SULFATE, POLYMYXIN B SULFATE, AND DEXAMETHASONE 3.5; 10000; 1 MG/G; [USP'U]/G; MG/G
OINTMENT OPHTHALMIC AS NEEDED
Status: DISCONTINUED | OUTPATIENT
Start: 2022-02-21 | End: 2022-02-21 | Stop reason: HOSPADM

## 2022-02-21 RX ORDER — LIDOCAINE HYDROCHLORIDE 40 MG/ML
INJECTION, SOLUTION RETROBULBAR; TOPICAL AS NEEDED
Status: DISCONTINUED | OUTPATIENT
Start: 2022-02-21 | End: 2022-02-21 | Stop reason: HOSPADM

## 2022-02-21 RX ORDER — SODIUM CHLORIDE 9 MG/ML
INJECTION, SOLUTION INTRAVENOUS
Status: DISCONTINUED | OUTPATIENT
Start: 2022-02-21 | End: 2022-02-21 | Stop reason: HOSPADM

## 2022-02-21 RX ORDER — TETRACAINE HYDROCHLORIDE 5 MG/ML
SOLUTION OPHTHALMIC AS NEEDED
Status: DISCONTINUED | OUTPATIENT
Start: 2022-02-21 | End: 2022-02-21 | Stop reason: HOSPADM

## 2022-02-21 RX ORDER — LIDOCAINE HYDROCHLORIDE 10 MG/ML
0.1 INJECTION, SOLUTION EPIDURAL; INFILTRATION; INTRACAUDAL; PERINEURAL AS NEEDED
Status: DISCONTINUED | OUTPATIENT
Start: 2022-02-21 | End: 2022-02-21 | Stop reason: HOSPADM

## 2022-02-21 RX ORDER — TROPICAMIDE 10 MG/ML
1 SOLUTION/ DROPS OPHTHALMIC
Status: COMPLETED | OUTPATIENT
Start: 2022-02-21 | End: 2022-02-21

## 2022-02-21 RX ORDER — SODIUM CHLORIDE 0.9 % (FLUSH) 0.9 %
5-40 SYRINGE (ML) INJECTION AS NEEDED
Status: DISCONTINUED | OUTPATIENT
Start: 2022-02-21 | End: 2022-02-21 | Stop reason: HOSPADM

## 2022-02-21 RX ORDER — DIPHENHYDRAMINE HYDROCHLORIDE 50 MG/ML
12.5 INJECTION, SOLUTION INTRAMUSCULAR; INTRAVENOUS AS NEEDED
Status: DISCONTINUED | OUTPATIENT
Start: 2022-02-21 | End: 2022-02-21 | Stop reason: HOSPADM

## 2022-02-21 RX ORDER — FENTANYL CITRATE 50 UG/ML
25 INJECTION, SOLUTION INTRAMUSCULAR; INTRAVENOUS
Status: DISCONTINUED | OUTPATIENT
Start: 2022-02-21 | End: 2022-02-21 | Stop reason: HOSPADM

## 2022-02-21 RX ORDER — TROPICAMIDE 10 MG/ML
SOLUTION/ DROPS OPHTHALMIC
Status: COMPLETED
Start: 2022-02-21 | End: 2022-02-21

## 2022-02-21 RX ORDER — ONDANSETRON 2 MG/ML
4 INJECTION INTRAMUSCULAR; INTRAVENOUS AS NEEDED
Status: DISCONTINUED | OUTPATIENT
Start: 2022-02-21 | End: 2022-02-21 | Stop reason: HOSPADM

## 2022-02-21 RX ORDER — CIPROFLOXACIN HYDROCHLORIDE 3.5 MG/ML
1 SOLUTION/ DROPS TOPICAL
Status: COMPLETED | OUTPATIENT
Start: 2022-02-21 | End: 2022-02-21

## 2022-02-21 RX ORDER — MIDAZOLAM HYDROCHLORIDE 1 MG/ML
INJECTION, SOLUTION INTRAMUSCULAR; INTRAVENOUS AS NEEDED
Status: DISCONTINUED | OUTPATIENT
Start: 2022-02-21 | End: 2022-02-21 | Stop reason: HOSPADM

## 2022-02-21 RX ORDER — TROPICAMIDE 10 MG/ML
1 SOLUTION/ DROPS OPHTHALMIC
Status: DISCONTINUED | OUTPATIENT
Start: 2022-02-21 | End: 2022-02-21 | Stop reason: HOSPADM

## 2022-02-21 RX ORDER — ONDANSETRON 2 MG/ML
INJECTION INTRAMUSCULAR; INTRAVENOUS AS NEEDED
Status: DISCONTINUED | OUTPATIENT
Start: 2022-02-21 | End: 2022-02-21 | Stop reason: HOSPADM

## 2022-02-21 RX ADMIN — TROPICAMIDE 1 DROP: 10 SOLUTION/ DROPS OPHTHALMIC at 09:27

## 2022-02-21 RX ADMIN — MIDAZOLAM HYDROCHLORIDE 2 MG: 1 INJECTION, SOLUTION INTRAMUSCULAR; INTRAVENOUS at 09:55

## 2022-02-21 RX ADMIN — SODIUM CHLORIDE 25 ML/HR: 9 INJECTION, SOLUTION INTRAVENOUS at 09:26

## 2022-02-21 RX ADMIN — CIPROFLOXACIN 1 DROP: 3 SOLUTION OPHTHALMIC at 09:30

## 2022-02-21 RX ADMIN — FENTANYL CITRATE 25 MCG: 50 INJECTION, SOLUTION INTRAMUSCULAR; INTRAVENOUS at 09:56

## 2022-02-21 RX ADMIN — TROPICAMIDE 1 DROP: 10 SOLUTION/ DROPS OPHTHALMIC at 09:30

## 2022-02-21 RX ADMIN — FENTANYL CITRATE 50 MCG: 50 INJECTION, SOLUTION INTRAMUSCULAR; INTRAVENOUS at 10:02

## 2022-02-21 RX ADMIN — CIPROFLOXACIN 1 DROP: 3 SOLUTION OPHTHALMIC at 09:34

## 2022-02-21 RX ADMIN — FENTANYL CITRATE 25 MCG: 50 INJECTION, SOLUTION INTRAMUSCULAR; INTRAVENOUS at 09:59

## 2022-02-21 RX ADMIN — CIPROFLOXACIN 1 DROP: 3 SOLUTION OPHTHALMIC at 09:27

## 2022-02-21 RX ADMIN — SODIUM CHLORIDE: 0.9 INJECTION, SOLUTION INTRAVENOUS at 09:54

## 2022-02-21 RX ADMIN — ONDANSETRON HYDROCHLORIDE 4 MG: 2 INJECTION, SOLUTION INTRAMUSCULAR; INTRAVENOUS at 09:56

## 2022-02-21 RX ADMIN — TROPICAMIDE 1 DROP: 10 SOLUTION/ DROPS OPHTHALMIC at 09:34

## 2022-02-21 NOTE — PERIOP NOTES
1026 Pt arrived to PACU. PT drowsy. VSS> Denies pain. [phone] Pt concerned about the rain tomorrow because her daughter cannot take her to her post op appointment if it rains. Dr. Hilda Dow at bedside and said his office will calll her this afternoon to discuss a good time to come tomorrow. 1055 Pt alert and oriented. Denies pain. VSS. Pt meets discharge criteria.

## 2022-02-21 NOTE — OP NOTES
Date of Procedure: 2/21/2022  Preoperative Diagnosis: Nuclear Sclerotic cataract left eye H25.12  Postoperative Diagnosis: Nuclear Sclerotic cataract left eye H25.12  Procedure: Extracapsular cataract extraction with lens implant left eye  Surgeon:  SHIVANI Mcdonough MD  Assistants: None  Anesthesia: MAC with local  Estimated Blood Loss: None  Findings: Cataract left eye  Complications: None  Specimens: None  Prosthetic Devices: Intraocular lens implant     The patient's left eye was dilated with mydriacyl 1% and ciprofloxacin 0.3% for 3 doses preoperatively. The patient was taken to the operating room and was given sedation. Tetracaine was given topically to the left eye, and the eye was prepped and draped in the usual manner for sterile eye surgery, including Betadine solution being dropped onto the conjunctiva at the beginning of the prep. The eyelashes were isolated with a plastic drape. A lid speculum was placed.     A #15 blade was used to make a paracentesis at the 5:00 location. The eye was flushed with a lidocaine / epinephrine mixture (\"Shugarcaine\"). The eye was filled with Provisc, and a crescent blade was used to make a 2.5 mm incision at the limbus temporally. This was dissected 2 mm into clear cornea, and the eye was entered with a 2.4 mm keratome. A 0.12 forceps was used for fixation during the procedure. A capsulorhexis flap was started with a cystotome, and this was completed 360 degrees with Utrata forceps. The capsular piece was removed. [place] dissection was performed with the \"Shugarcaine\" mixture on a cannula.     The lens nucleus was removed using phacoemulsification with a total phaco time of 1:15 minutes at 18.3%.     The lens was cracked and manipulated with a Sinsky hook. Residual cortex was removed using irrigation / aspiration.   The capsule remained intact.     The capsule was refilled with Provisc, and an Arie Intraocular lens model [id number] power 20.0 was placed in a lens folding cartridge with Provisc.     The lens was unfolded into the capsular bag. The lens centered well. Residual Provisc was removed using I / A. One 10-0 Vicryl suture was used to close the incision. The knot was tied, cut and rotated under the cornea. The eye was flushed with BSS through the paracentesis. Betadine solution was placed on the conjunctival surface at the end of the case. The eye was left soft and formed at the end of the case. The incision site was water tight. The speculum was removed, and a drop of timolol 0.5% and neomycin/polymixin/dexamethasone ointment was placed on the eye followed by a shield. The patient tolerated the procedure well and is to follow-up in one day.

## 2022-02-21 NOTE — H&P
Surgery History and Physcial    Subjective:      Tiff Mcdonnell is a 74 y.o. female with visually significant cataract left eye for cataract extraction lens implant left eye. Patient Active Problem List    Diagnosis Date Noted    Coronary artery disease with stable angina pectoris ([address] 2020    Lupus ([address] 2020    Sick sinus syndrome ([address] 2020    Essential hypertension 2020    Hyperlipidemia 2020    Lumbar stenosis 2020    Episode of moderate major depression ([address] 2020    Arthritis of right knee 2020     Past Medical History:   Diagnosis Date    Arrhythmia     Arthritis     CAD (coronary artery disease)     Cervical cancer ([address]     Chronic pain     Depression     Hypertension     Lupus (systemic lupus erythematosus) ([address]     Personal history of COVID-19 2022    + preop test, asymptomatic    Stroke ([address]     Left sided weakness      Past Surgical History:   Procedure Laterality Date    HX HEART CATHETERIZATION      HX HYSTERECTOMY  1974    HX KNEE REPLACEMENT Right 10/2019    HX PACEMAKER   approx. 2000    VASCULAR SURGERY PROCEDURE UNLIST        Social History     Tobacco Use    Smoking status: Former Smoker     Quit date: 2015     Years since quittin.9    Smokeless tobacco: Former User     Quit date: 3/4/2015   Substance Use Topics    Alcohol use: Yes     Alcohol/week: 3.0 standard drinks     Types: 3 Cans of beer per week     Comment: occasionally      Family History   Problem Relation Age of Onset    Lung Disease Father     OSTEOARTHRITIS Daughter       Prior to Admission medications    Medication Sig Start Date End Date Taking? Authorizing Provider   cloNIDine HCL (CATAPRES) 0.2 mg tablet Take 1 Tablet by mouth two (2) times a day. 22  Yes Pool Ernst MD   hydroCHLOROthiazide (HYDRODIURIL) 12.5 mg tablet Take 1 Tablet by mouth daily.  22  Yes Pool Ernst MD   simvastatin (ZOCOR) 40 mg tablet TAKE 1 TABLET BY MOUTH AT BEDTIME 1/8/22  Yes Ledy Kaur MD   cilostazoL (PLETAL) 50 mg tablet TAKE 1 TABLET BY MOUTH TWICE DAILY 1/8/22  Yes Ledy Kaur MD   aspirin delayed-release 81 mg tablet Take 81 mg by mouth daily. Yes Provider, Historical   carvediloL (COREG) 25 mg tablet Take 1 Tablet by mouth daily. Patient taking differently: Take 12.5 mg by mouth two (2) times daily (with meals). 10/4/21  Yes Ledy Kaur MD   sertraline (ZOLOFT) 100 mg tablet Take 1 Tablet by mouth daily. 10/4/21  Yes Ledy Kaur MD   fluocinoNIDE (LIDEX) 0.05 % topical cream Apply 0.5 g to affected area two (2) times a day. As needed  Patient taking differently: Apply 0.5 g to affected area three (3) times daily. As needed 3/5/20  Yes Ledy Kaur MD   cholecalciferol (VITAMIN D3) (1000 Units /25 mcg) tablet Take 2,000 Units by mouth daily. Yes Provider, Historical     Allergies   Allergen Reactions    Adhesive Rash and Other (comments)     Skin removal    Lisinopril Rash and Angioedema         Review of Systems   All other systems reviewed and are negative. Objective:     Visit Vitals  /83 (BP 1 Location: Right upper arm, BP Patient Position: At rest)   Pulse 90   Temp 97.8 °F (36.6 °C)   Resp 15   Ht 5' 5\" (1.651 m)   Wt 79.8 kg (176 lb)   SpO2 97%   BMI 29.29 kg/m²       Physical Exam  HENT:      Head: Normocephalic and atraumatic. Cardiovascular:      Heart sounds: Normal heart sounds. Pulmonary:      Breath sounds: Normal breath sounds. Abdominal:      General: Bowel sounds are normal.   Musculoskeletal:         General: Normal range of motion. Cervical back: Normal range of motion. Neurological:      General: No focal deficit present. Mental Status: She is alert. Psychiatric:         Mood and Affect: Mood normal.         Imaging:      Lab Review:  No results found for this or any previous visit (from the past 24 hour(s)).       Assessment:     Visually significant cataract left eye for cataract extraction lens implant left eye. Plan:     Visually significant cataract left eye for cataract extraction lens implant left eye.

## 2022-02-21 NOTE — ANESTHESIA POSTPROCEDURE EVALUATION
Procedure(s):  LEFT EYE CATARACT EXTRACTION WITH INTRA OCULAR LENS IMPLANT. MAC    Anesthesia Post Evaluation      Multimodal analgesia: multimodal analgesia used between 6 hours prior to anesthesia start to PACU discharge  Patient location during evaluation: PACU  Patient participation: complete - patient participated  Level of consciousness: awake and alert  Pain score: 0  Airway patency: patent  Anesthetic complications: no  Cardiovascular status: acceptable  Respiratory status: acceptable  Hydration status: acceptable  Post anesthesia nausea and vomiting:  none  Final Post Anesthesia Temperature Assessment:  Normothermia (36.0-37.5 degrees C)      INITIAL Post-op Vital signs:   Vitals Value Taken Time   /65 02/21/22 1044   Temp 36.7 °C (98 °F) 02/21/22 1044   Pulse 70 02/21/22 1046   Resp 30 02/21/22 1046   SpO2 97 % 02/21/22 1046   Vitals shown include unvalidated device data.

## 2022-02-21 NOTE — PERIOP NOTES
Permission received to review discharge instructions and discuss private health information with daughter, Donovan Estrella. Patient states that daughter will be with them for at least 24 hours following today's procedure.

## 2022-02-21 NOTE — ANESTHESIA PREPROCEDURE EVALUATION
Relevant Problems   NEUROLOGY   (+) Episode of moderate major depression (HCC)      CARDIOVASCULAR   (+) Coronary artery disease with stable angina pectoris (HCC)   (+) Essential hypertension   (+) Sick sinus syndrome (HCC)      ENDOCRINE   (+) Arthritis of right knee       Anesthetic History   No history of anesthetic complications            Review of Systems / Medical History  Patient summary reviewed, nursing notes reviewed and pertinent labs reviewed    Pulmonary  Within defined limits                 Neuro/Psych       CVA (mild left-sided weakness)  Psychiatric history    Comments: depression Cardiovascular            Dysrhythmias (SSS)   Pacemaker, CAD and PAD (had angioplasty of femoral artery)    Exercise tolerance: <4 METS  Comments: Has PM   GI/Hepatic/Renal  Within defined limits              Endo/Other        Arthritis     Other Findings   Comments: Caytaracts    SLE  Chronic pain  in feet         Physical Exam    Airway  Mallampati: I  TM Distance: 4 - 6 cm  Neck ROM: decreased range of motion   Mouth opening: Normal     Cardiovascular    Rhythm: regular  Rate: normal         Dental    Dentition: Loose teeth  Comments:  All teeth are loose d/t Lupus   Pulmonary  Breath sounds clear to auscultation               Abdominal  GI exam deferred       Other Findings            Anesthetic Plan    ASA: 3  Anesthesia type: MAC          Induction: Intravenous  Anesthetic plan and risks discussed with: Patient

## 2022-02-21 NOTE — PERIOP NOTES
Matilde Mcburney  1950  [id number]    Situation:  Verbal report given from: Susan POSADAS  Procedure: Procedure(s):  LEFT EYE CATARACT EXTRACTION WITH INTRA OCULAR LENS IMPLANT    Background:    Preoperative diagnosis: Nuclear sclerotic cataract of left eye [H25.12]    Postoperative diagnosis: Nuclear sclerotic cataract of left eye [H25.12]    :  Dr. Jay Xie    Assistant(s): Circ-1: Anamika Forde  Circ-2: Neva Landeros RN  Scrub Tech-1: Rene Tello    Specimens: * No specimens in log *    Assessment:  Intra-procedure medications         Anesthesia gave intra-procedure sedation and medications, see anesthesia flow sheet     Intravenous fluids: LR@ KVO     Vital signs stable       Recommendation:    Permission to share finding with family : yes

## 2022-02-21 NOTE — BRIEF OP NOTE
Brief Postoperative Note    Patient: Popeye Hopkins  YOB: 1950  MRN: [id number]    Date of Procedure: 2/21/2022     Pre-Op Diagnosis: Nuclear sclerotic cataract of left eye [H25.12]    Post-Op Diagnosis: Nuclear Sclerotic cataract left eye H25.12     Procedure(s):  LEFT EYE CATARACT EXTRACTION WITH INTRA OCULAR LENS IMPLANT    Surgeon(s):  Nubia Sky MD    Surgical Assistant: None    Anesthesia: MAC     Estimated Blood Loss (mL): 0    Complications: none    Specimens: * No specimens in log *     Implants:   Implant Name Type Inv. Item Serial No.  Lot No. LRB No. Used Action   LENS IOL POST 1-PC 6X13 20.0 -- [employer] - [id number]  LENS IOL POST 1-PC 6X13 20.0 -- [employer] [id number] N/A Left 1 Implanted       Drains: * No LDAs found *    Findings: Visually significant cataract left eye.     Electronically Signed by Karla Sanders MD on 2/21/2022 at 10:32 AM

## 2022-02-21 NOTE — DISCHARGE INSTRUCTIONS
Xander Medrano MD  [address] [place] [address] Drive   HORACE Ferminu, [address]  Phone: [phone]  If you are unable to keep appointment, kindly give 24 hours notice please. REMOVE PATCH  START DROPS WHEN YOU GET HOME  PUT PATCH BACK ON AT BEDTIME    1. DO NOT RUB the eye that was operated on. 2. Do not strain excessively. It is all right to bend as long as you do not strain. 3. It is safe to take a shower, wash your face, and wash your hair. Just keep the eye closed. 4. Do not swim for 1 week after surgery. 5. If you have any problems or questions, do not hesitate to call .  6. Follow instructions on eye drops from office. 7. You may take Tylenol or Advil for discomfort. If it pressure not relieved by Tylenol or Advil, please call Dr. Leonard Begin office. TO PREVENT AN INFECTION      1. [facility] YOUR HANDS     To prevent infection, good handwashing is the most important thing you or your caregiver can do.  Wash your hands with soap and water or use the hand  we gave you before you touch any wounds. 2.  USE CLEAN SHEETS     Use freshly cleaned sheets on your bed after surgery.  To keep the surgery site clean, do not allow pets to sleep with you while your wound is still healing. 3. STOP SMOKING    Stop smoking, or at least cut back on smoking     Smoking slows your healing. 4.  CONTROL YOUR BLOOD SUGAR     High blood sugars slow wound healing.  If you are diabetic, control your blood sugar levels before and after your surgery. If you were given prescriptions, please review the written information on the prescribed medications. DO NOT DRIVE WHILE TAKING NARCOTIC PAIN MEDICATIONS. DISCHARGE SUMMARY from Nurse    The following personal items collected during your admission are returned to you:   Dental Appliance: Dental Appliances:  Other (comment) (\"all of my teeth are loose\")  Vision: Visual Aid: None  Hearing Aid:    Jewelry: Jewelry: Earrings (Earrings given to daughter)  Clothing: Clothing: With patient,Jacket/Coat (Coat underneath stretcher)  Other Valuables: Other Valuables: None  Valuables sent to safe:      PATIENT INSTRUCTIONS:    After general anesthesia or intravenous sedation, for 24 hours or while taking prescription Narcotics:  · Someone should be with you for the next 24 hours. · For your own safety, a responsible adult must drive you home. · Limit your activities  · Recommended activity: Rest today, Do not climb stairs or shower unattended for the next 24 hours. · Do not drive and operate hazardous machinery  · Do not make important personal or business decisions  · Do  not drink alcoholic beverages  · If you have not urinated within 8 hours after discharge, please contact your surgeon on call. Report the following to your surgeon:  · Excessive pain, swelling, redness or odor of or around the surgical area  · Temperature over 100.5  · Nausea and vomiting lasting longer than 4 hours or if unable to take medications    · You will receive a Post Operative Call from one of the Recovery Room Nurses on the day after your surgery to check on you. It is very important for us to know how you are recovering after your surgery. · You may receive an e-mail or letter in the mail from [employer] regarding your experience with us in the Ambulatory Surgery Unit. Your feedback is valuable to us and we appreciate your participation in the survey. · We wish youre a speedy recovery ? What to do at Home:      *  Please give a list of your current medications to your Primary Care Provider. *  Please update this list whenever your medications are discontinued, doses are      changed, or new medications (including over-the-counter products) are added. *  Please carry medication information at all times in case of emergency situations.           These are general instructions for a healthy lifestyle:    No smoking/ No tobacco products/ Avoid exposure to second hand smoke    Surgeon General's Warning:  Quitting smoking now greatly reduces serious risk to your health. Obesity, smoking, and sedentary lifestyle greatly increases your risk for illness    A healthy diet, regular physical exercise & weight monitoring are important for maintaining a healthy lifestyle    You may be retaining fluid if you have a history of heart failure or if you experience any of the following symptoms:  Weight gain of 3 pounds or more overnight or 5 pounds in a week, increased swelling in our hands or feet or shortness of breath while lying flat in bed. Please call your doctor as soon as you notice any of these symptoms; do not wait until your next office visit. Recognize signs and symptoms of STROKE:    B - Balance  E - Eyes    F-face looks uneven    A-arms unable to move or move even    S-speech slurred or non-existent    T-time-call 911 as soon as signs and symptoms begin-DO NOT go       Back to bed or wait to see if you get better-TIME IS BRAIN. If you have not received your influenza and/or pneumococcal vaccine, please follow up with your primary care physician. The discharge information has been reviewed with the patient and caregiver. The patient and caregiver verbalized understanding.

## 2022-02-23 NOTE — PERIOP NOTES
[facility]  Ambulatory Surgery Unit  Pre-operative Instructions    Surgery/Procedure Date  Monday, February 28, 2022            Tentative Arrival Time TBD      1. On the day of your surgery/procedure, please report to the Ambulatory Surgery Unit Registration Desk and sign in at your designated time. The Ambulatory Surgery Unit is located in [facility] on the Critical access hospital side of the [facility] across from the [address] [place] [address]. Please have all of your health insurance cards and a photo ID. **Due to current COVID restrictions, only ONE adult may accompany you the day of the procedure. We have limited seating available. If our waiting room is at capacity, your ride may be asked to remain in their vehicle. No children are allowed in the waiting room. 2. You must have someone with you to drive you home, as you should not drive a car for 24 hours following anesthesia. Please make arrangements for a responsible adult friend or family member to stay with you for at least the first 24 hours after your surgery. 3. Do not have anything to eat or drink (including water, gum, mints, coffee, juice) after 11:59 PM, Sunday. This may not apply to medications prescribed by your physician. (Please note below the special instructions with medications to take the morning of surgery, if applicable.)    4. We recommend you do not drink any alcoholic beverages for 24 hours before and after your surgery. 5. Contact your surgeons office for instructions on the following medications: non-steroidal anti-inflammatory drugs (i.e. Advil, Aleve), vitamins, and supplements. (Some surgeons will want you to stop these medications prior to surgery and others may allow you to take them)   **If you are currently taking Plavix, Coumadin, Aspirin and/or other blood-thinning agents, contact your surgeon for instructions. ** Your surgeon will partner with the physician prescribing these medications to determine if it is safe to stop or if you need to continue taking. Please do not stop taking these medications without instructions from your surgeon. 6. In an effort to help prevent surgical site infection, we ask that you shower with an anti-bacterial soap (i.e. Dial/Safeguard, or the soap provided to you at your preadmission testing appointment) for 3 days prior to and on the morning of surgery, using a fresh towel after each shower. (Please begin this process with fresh bed linens.) Do not apply any lotions, powders, or deodorants after the shower on the day of your procedure. If applicable, please do not shave the operative site for 48 hours prior to surgery. 7. Wear comfortable clothes. Wear glasses instead of contacts. Do not bring any jewelry or money (other than copays or fees as instructed). Do not wear make-up, particularly mascara, the morning of your surgery. Do not wear nail polish, particularly if you are having foot /hand surgery. Wear your hair loose or down, no ponytails, buns, lianne pins or clips. All body piercings must be removed. 8. You should understand that if you do not follow these instructions your surgery may be cancelled. If your physical condition changes (i.e. fever, cold or flu) please contact your surgeon as soon as possible. 9. It is important that you be on time. If a situation occurs where you may be late, or if you have any questions or problems, please call [phone].    10. Your surgery time may be subject to change. You will receive a phone call the day prior to surgery to confirm your arrival time. 11. Pediatric patients: please bring a change of clothes, diapers, bottle/sippy cup, pacifier, etc.      Special Instructions: Take all medications and inhalers, as prescribed, on the morning of surgery with a sip of water. I understand a pre-operative phone call will be made to verify my surgery time.   In the event that I am not available, I give permission for a message to be left on my answering service and/or with another person?       yes    Preop instructions reviewed  Pt verbalized understanding.      ___________________      ___________________      ________________  (Signature of Patient)          (Witness)                   (Date and Time)

## 2022-02-24 ENCOUNTER — HOSPITAL ENCOUNTER (OUTPATIENT)
Dept: PREADMISSION TESTING | Age: 72
Discharge: HOME | End: 2022-02-24
Payer: MEDICARE

## 2022-02-24 LAB
SARS-COV-2, XPLCVT: NOT DETECTED
SOURCE, COVRS: NORMAL

## 2022-02-24 PROCEDURE — U0005 INFEC AGEN DETEC AMPLI PROBE: HCPCS

## 2022-02-25 ENCOUNTER — ANESTHESIA EVENT (OUTPATIENT)
Dept: SURGERY | Age: 72
End: 2022-02-25
Payer: MEDICARE

## 2022-02-28 ENCOUNTER — ANESTHESIA (OUTPATIENT)
Dept: SURGERY | Age: 72
End: 2022-02-28
Payer: MEDICARE

## 2022-02-28 ENCOUNTER — HOSPITAL ENCOUNTER (OUTPATIENT)
Age: 72
Setting detail: OUTPATIENT SURGERY
Discharge: HOME | End: 2022-02-28
Attending: OPHTHALMOLOGY | Admitting: OPHTHALMOLOGY
Payer: MEDICARE

## 2022-02-28 VITALS
OXYGEN SATURATION: 99 % | SYSTOLIC BLOOD PRESSURE: 109 MMHG | HEIGHT: 65 IN | WEIGHT: 178.8 LBS | RESPIRATION RATE: 13 BRPM | TEMPERATURE: 97.9 F | BODY MASS INDEX: 29.79 KG/M2 | HEART RATE: 72 BPM | DIASTOLIC BLOOD PRESSURE: 84 MMHG

## 2022-02-28 PROCEDURE — 74011000250 HC RX REV CODE- 250: Performed by: OPHTHALMOLOGY

## 2022-02-28 PROCEDURE — V2632 POST CHMBR INTRAOCULAR LENS: HCPCS | Performed by: OPHTHALMOLOGY

## 2022-02-28 PROCEDURE — 74011250636 HC RX REV CODE- 250/636: Performed by: NURSE ANESTHETIST, CERTIFIED REGISTERED

## 2022-02-28 PROCEDURE — 74011000250 HC RX REV CODE- 250

## 2022-02-28 PROCEDURE — 76060000061 HC AMB SURG ANES 0.5 TO 1 HR: Performed by: OPHTHALMOLOGY

## 2022-02-28 PROCEDURE — 77030021352 HC CBL LD SYS DISP COVD -B: Performed by: OPHTHALMOLOGY

## 2022-02-28 PROCEDURE — 74011000250 HC RX REV CODE- 250: Performed by: NURSE ANESTHETIST, CERTIFIED REGISTERED

## 2022-02-28 PROCEDURE — 76030000000 HC AMB SURG OR TIME 0.5 TO 1: Performed by: OPHTHALMOLOGY

## 2022-02-28 PROCEDURE — 74011250636 HC RX REV CODE- 250/636: Performed by: OPHTHALMOLOGY

## 2022-02-28 PROCEDURE — 76210000046 HC AMBSU PH II REC FIRST 0.5 HR: Performed by: OPHTHALMOLOGY

## 2022-02-28 PROCEDURE — 2709999900 HC NON-CHARGEABLE SUPPLY: Performed by: OPHTHALMOLOGY

## 2022-02-28 PROCEDURE — 76210000040 HC AMBSU PH I REC FIRST 0.5 HR: Performed by: OPHTHALMOLOGY

## 2022-02-28 DEVICE — LENS IOL POST 1-PC 6X13 20.5 -- ACRYSOF: Type: IMPLANTABLE DEVICE | Site: EYE | Status: FUNCTIONAL

## 2022-02-28 RX ORDER — NEOMYCIN SULFATE, POLYMYXIN B SULFATE, AND DEXAMETHASONE 3.5; 10000; 1 MG/G; [USP'U]/G; MG/G
OINTMENT OPHTHALMIC AS NEEDED
Status: DISCONTINUED | OUTPATIENT
Start: 2022-02-28 | End: 2022-02-28 | Stop reason: HOSPADM

## 2022-02-28 RX ORDER — MIDAZOLAM HYDROCHLORIDE 1 MG/ML
INJECTION, SOLUTION INTRAMUSCULAR; INTRAVENOUS AS NEEDED
Status: DISCONTINUED | OUTPATIENT
Start: 2022-02-28 | End: 2022-02-28 | Stop reason: HOSPADM

## 2022-02-28 RX ORDER — TROPICAMIDE 10 MG/ML
1 SOLUTION/ DROPS OPHTHALMIC
Status: COMPLETED | OUTPATIENT
Start: 2022-02-28 | End: 2022-02-28

## 2022-02-28 RX ORDER — TETRACAINE HYDROCHLORIDE 5 MG/ML
SOLUTION OPHTHALMIC AS NEEDED
Status: DISCONTINUED | OUTPATIENT
Start: 2022-02-28 | End: 2022-02-28 | Stop reason: HOSPADM

## 2022-02-28 RX ORDER — FENTANYL CITRATE 50 UG/ML
INJECTION, SOLUTION INTRAMUSCULAR; INTRAVENOUS AS NEEDED
Status: DISCONTINUED | OUTPATIENT
Start: 2022-02-28 | End: 2022-02-28 | Stop reason: HOSPADM

## 2022-02-28 RX ORDER — FENTANYL CITRATE 50 UG/ML
25 INJECTION, SOLUTION INTRAMUSCULAR; INTRAVENOUS
Status: DISCONTINUED | OUTPATIENT
Start: 2022-02-28 | End: 2022-02-28 | Stop reason: HOSPADM

## 2022-02-28 RX ORDER — TIMOLOL MALEATE 5 MG/ML
SOLUTION/ DROPS OPHTHALMIC AS NEEDED
Status: DISCONTINUED | OUTPATIENT
Start: 2022-02-28 | End: 2022-02-28 | Stop reason: HOSPADM

## 2022-02-28 RX ORDER — SODIUM CHLORIDE, SODIUM LACTATE, POTASSIUM CHLORIDE, CALCIUM CHLORIDE 600; 310; 30; 20 MG/100ML; MG/100ML; MG/100ML; MG/100ML
25 INJECTION, SOLUTION INTRAVENOUS CONTINUOUS
Status: DISCONTINUED | OUTPATIENT
Start: 2022-02-28 | End: 2022-02-28 | Stop reason: HOSPADM

## 2022-02-28 RX ORDER — SODIUM CHLORIDE 0.9 % (FLUSH) 0.9 %
5-40 SYRINGE (ML) INJECTION AS NEEDED
Status: DISCONTINUED | OUTPATIENT
Start: 2022-02-28 | End: 2022-02-28 | Stop reason: HOSPADM

## 2022-02-28 RX ORDER — DEXMEDETOMIDINE HYDROCHLORIDE 100 UG/ML
INJECTION, SOLUTION INTRAVENOUS AS NEEDED
Status: DISCONTINUED | OUTPATIENT
Start: 2022-02-28 | End: 2022-02-28 | Stop reason: HOSPADM

## 2022-02-28 RX ORDER — SODIUM CHLORIDE 0.9 % (FLUSH) 0.9 %
5-40 SYRINGE (ML) INJECTION EVERY 8 HOURS
Status: DISCONTINUED | OUTPATIENT
Start: 2022-02-28 | End: 2022-02-28 | Stop reason: HOSPADM

## 2022-02-28 RX ORDER — ONDANSETRON 2 MG/ML
4 INJECTION INTRAMUSCULAR; INTRAVENOUS AS NEEDED
Status: DISCONTINUED | OUTPATIENT
Start: 2022-02-28 | End: 2022-02-28 | Stop reason: HOSPADM

## 2022-02-28 RX ORDER — SODIUM CHLORIDE 9 MG/ML
25 INJECTION, SOLUTION INTRAVENOUS CONTINUOUS
Status: DISCONTINUED | OUTPATIENT
Start: 2022-02-28 | End: 2022-02-28 | Stop reason: HOSPADM

## 2022-02-28 RX ORDER — DIPHENHYDRAMINE HYDROCHLORIDE 50 MG/ML
12.5 INJECTION, SOLUTION INTRAMUSCULAR; INTRAVENOUS AS NEEDED
Status: DISCONTINUED | OUTPATIENT
Start: 2022-02-28 | End: 2022-02-28 | Stop reason: HOSPADM

## 2022-02-28 RX ORDER — CIPROFLOXACIN HYDROCHLORIDE 3.5 MG/ML
1 SOLUTION/ DROPS TOPICAL
Status: COMPLETED | OUTPATIENT
Start: 2022-02-28 | End: 2022-02-28

## 2022-02-28 RX ORDER — TROPICAMIDE 10 MG/ML
SOLUTION/ DROPS OPHTHALMIC
Status: COMPLETED
Start: 2022-02-28 | End: 2022-02-28

## 2022-02-28 RX ORDER — LIDOCAINE HYDROCHLORIDE 10 MG/ML
0.1 INJECTION, SOLUTION EPIDURAL; INFILTRATION; INTRACAUDAL; PERINEURAL AS NEEDED
Status: DISCONTINUED | OUTPATIENT
Start: 2022-02-28 | End: 2022-02-28 | Stop reason: HOSPADM

## 2022-02-28 RX ADMIN — TROPICAMIDE 1 DROP: 10 SOLUTION/ DROPS OPHTHALMIC at 10:53

## 2022-02-28 RX ADMIN — DEXMEDETOMIDINE HYDROCHLORIDE 2 MCG: 100 INJECTION, SOLUTION, CONCENTRATE INTRAVENOUS at 11:48

## 2022-02-28 RX ADMIN — FENTANYL CITRATE 50 MCG: 50 INJECTION, SOLUTION INTRAMUSCULAR; INTRAVENOUS at 11:37

## 2022-02-28 RX ADMIN — MIDAZOLAM HYDROCHLORIDE 1 MG: 1 INJECTION, SOLUTION INTRAMUSCULAR; INTRAVENOUS at 11:25

## 2022-02-28 RX ADMIN — TROPICAMIDE 1 DROP: 10 SOLUTION/ DROPS OPHTHALMIC at 10:48

## 2022-02-28 RX ADMIN — CIPROFLOXACIN 1 DROP: 3 SOLUTION OPHTHALMIC at 10:53

## 2022-02-28 RX ADMIN — MIDAZOLAM HYDROCHLORIDE 1 MG: 1 INJECTION, SOLUTION INTRAMUSCULAR; INTRAVENOUS at 11:34

## 2022-02-28 RX ADMIN — SODIUM CHLORIDE 25 ML/HR: 9 INJECTION, SOLUTION INTRAVENOUS at 10:49

## 2022-02-28 RX ADMIN — FENTANYL CITRATE 50 MCG: 50 INJECTION, SOLUTION INTRAMUSCULAR; INTRAVENOUS at 11:29

## 2022-02-28 RX ADMIN — CIPROFLOXACIN 1 DROP: 3 SOLUTION OPHTHALMIC at 11:05

## 2022-02-28 RX ADMIN — DEXMEDETOMIDINE HYDROCHLORIDE 2 MCG: 100 INJECTION, SOLUTION, CONCENTRATE INTRAVENOUS at 11:39

## 2022-02-28 RX ADMIN — TROPICAMIDE 1 DROP: 10 SOLUTION/ DROPS OPHTHALMIC at 11:05

## 2022-02-28 RX ADMIN — CIPROFLOXACIN 1 DROP: 3 SOLUTION OPHTHALMIC at 10:48

## 2022-02-28 RX ADMIN — DEXMEDETOMIDINE HYDROCHLORIDE 2 MCG: 100 INJECTION, SOLUTION, CONCENTRATE INTRAVENOUS at 11:45

## 2022-02-28 NOTE — ANESTHESIA POSTPROCEDURE EVALUATION
Procedure(s):  RIGHT EYE CATARACT EXTRACTION WITH INTRA OCULAR LENS IMPLANT. MAC    Anesthesia Post Evaluation      Multimodal analgesia: multimodal analgesia used between 6 hours prior to anesthesia start to PACU discharge  Patient location during evaluation: PACU  Patient participation: complete - patient participated  Level of consciousness: awake and alert  Pain score: 0  Airway patency: patent  Anesthetic complications: no  Cardiovascular status: acceptable  Respiratory status: acceptable  Hydration status: acceptable  Post anesthesia nausea and vomiting:  none  Final Post Anesthesia Temperature Assessment:  Normothermia (36.0-37.5 degrees C)      INITIAL Post-op Vital signs:   Vitals Value Taken Time   /71 02/28/22 1206   Temp 36.6 °C (97.9 °F) 02/28/22 1205   Pulse 77 02/28/22 1209   Resp 13 02/28/22 1209   SpO2 100 % 02/28/22 1209   Vitals shown include unvalidated device data.

## 2022-02-28 NOTE — BRIEF OP NOTE
Brief Postoperative Note    Patient: Kay House  YOB: 1950  MRN: [id number]    Date of Procedure: 2/28/2022     Pre-Op Diagnosis: Nuclear sclerotic cataract of right eye [H25.11]    Post-Op Diagnosis: Nuclear Sclerotic cataract right eye H25.11    Procedure(s):  RIGHT EYE CATARACT EXTRACTION WITH INTRA OCULAR LENS IMPLANT    Surgeon(s):  Nancy Allison MD    Surgical Assistant: None    Anesthesia: MAC     Estimated Blood Loss (mL): 0    Complications: none    Specimens: * No specimens in log *     Implants:   Implant Name Type Inv. Item Serial No.  Lot No. LRB No. Used Action   LENS IOL POST 1-PC 6X13 20.5 -- [employer] - [id number]  LENS IOL POST 1-PC 6X13 20.5 -- [employer] [id number] [employer] N/A Right 1 Implanted       Drains: * No LDAs found *    Findings: Visually significant cataract right eye.     Electronically Signed by Riki Pinto MD on 2/28/2022 at 12:10 PM

## 2022-02-28 NOTE — PERIOP NOTES
Juan Carlos Fix  1950  [id number]    Situation:  Verbal report given from: FUAD Bahena and Kevyn POSADAS  Procedure: Procedure(s):  RIGHT EYE CATARACT EXTRACTION WITH INTRA OCULAR LENS IMPLANT    Background:    Preoperative diagnosis: Nuclear sclerotic cataract of right eye [H25.11]    Postoperative diagnosis: Nuclear sclerotic cataract of right eye [H25.11]    :  Dr. Lazaro Harvey     Assistant(s): Circ-1: Naman Zarate  Circ-2: Christina Huston RN  Scrub Tech-1: Stephane BRUCE RT    Specimens: * No specimens in log *    Assessment:  Intra-procedure medications         Anesthesia gave intra-procedure sedation and medications, see anesthesia flow sheet     Intravenous fluids: NS @ KVO     Vital signs stable       Recommendation:    Permission to share finding with daughter : yes

## 2022-02-28 NOTE — ANESTHESIA PREPROCEDURE EVALUATION
Relevant Problems   NEUROLOGY   (+) Episode of moderate major depression (HCC)      CARDIOVASCULAR   (+) Coronary artery disease with stable angina pectoris (HCC)   (+) Essential hypertension   (+) Sick sinus syndrome (HCC)      ENDOCRINE   (+) Arthritis of right knee       Anesthetic History   No history of anesthetic complications            Review of Systems / Medical History  Patient summary reviewed, nursing notes reviewed and pertinent labs reviewed    Pulmonary  Within defined limits                 Neuro/Psych       CVA (mild left-sided weakness)  Psychiatric history    Comments: depression Cardiovascular            Dysrhythmias (SSS)   Pacemaker, CAD and PAD (had angioplasty of femoral artery)    Exercise tolerance: <4 METS  Comments: Has PM   GI/Hepatic/Renal  Within defined limits              Endo/Other        Arthritis     Other Findings   Comments: Cataracts    SLE  Chronic pain  in feet           Physical Exam    Airway  Mallampati: I  TM Distance: 4 - 6 cm  Neck ROM: decreased range of motion   Mouth opening: Normal     Cardiovascular    Rhythm: regular  Rate: normal         Dental    Dentition: Loose teeth  Comments:  All teeth are loose d/t Lupus   Pulmonary  Breath sounds clear to auscultation               Abdominal  GI exam deferred       Other Findings            Anesthetic Plan    ASA: 3  Anesthesia type: MAC          Induction: Intravenous  Anesthetic plan and risks discussed with: Patient

## 2022-02-28 NOTE — DISCHARGE INSTRUCTIONS
Caroline Guo MD  [address] [place] [address] Drive   HORACE Ferminu, [address]  Phone: [phone]  If you are unable to keep appointment, kindly give 24 hours notice please. REMOVE PATCH  START DROPS WHEN YOU GET HOME  PUT PATCH BACK ON AT BEDTIME    1. DO NOT RUB the eye that was operated on. 2. Do not strain excessively. It is all right to bend as long as you do not strain. 3. It is safe to take a shower, wash your face, and wash your hair. Just keep the eye closed. 4. Do not swim for 1 week after surgery. 5. If you have any problems or questions, do not hesitate to call .  6. Follow instructions on eye drops from office. 7. You may take Tylenol or Advil for discomfort. If it pressure not relieved by Tylenol or Advil, please call Dr. Benjamin Velez office. TO PREVENT AN INFECTION      1. [address] YOUR HANDS     To prevent infection, good handwashing is the most important thing you or your caregiver can do.  Wash your hands with soap and water or use the hand  we gave you before you touch any wounds. 2.  USE CLEAN SHEETS     Use freshly cleaned sheets on your bed after surgery.  To keep the surgery site clean, do not allow pets to sleep with you while your wound is still healing. 3. STOP SMOKING    Stop smoking, or at least cut back on smoking     Smoking slows your healing. 4.  CONTROL YOUR BLOOD SUGAR     High blood sugars slow wound healing.  If you are diabetic, control your blood sugar levels before and after your surgery. If you were given prescriptions, please review the written information on the prescribed medications. DO NOT DRIVE WHILE TAKING NARCOTIC PAIN MEDICATIONS.     DISCHARGE SUMMARY from Nurse    The following personal items collected during your admission are returned to you:   Dental Appliance: Dental Appliances: None  Vision: Visual Aid: None  Hearing Aid:    Jewelry: Jewelry: Earrings  Clothing: Clothing: Other (comment)  Other Valuables:    Valuables sent to safe:      PATIENT INSTRUCTIONS:    After general anesthesia or intravenous sedation, for 24 hours or while taking prescription Narcotics:  · Someone should be with you for the next 24 hours. · For your own safety, a responsible adult must drive you home. · Limit your activities  · Recommended activity: Rest today, Do not climb stairs or shower unattended for the next 24 hours. · Do not drive and operate hazardous machinery  · Do not make important personal or business decisions  · Do  not drink alcoholic beverages  · If you have not urinated within 8 hours after discharge, please contact your surgeon on call. Report the following to your surgeon:  · Excessive pain, swelling, redness or odor of or around the surgical area  · Temperature over 100.5  · Nausea and vomiting lasting longer than 4 hours or if unable to take medications    · You will receive a Post Operative Call from one of the Recovery Room Nurses on the day after your surgery to check on you. It is very important for us to know how you are recovering after your surgery. · You may receive an e-mail or letter in the mail from [employer] regarding your experience with us in the Ambulatory Surgery Unit. Your feedback is valuable to us and we appreciate your participation in the survey. · We wish youre a speedy recovery ? What to do at Home:      *  Please give a list of your current medications to your Primary Care Provider. *  Please update this list whenever your medications are discontinued, doses are      changed, or new medications (including over-the-counter products) are added. *  Please carry medication information at all times in case of emergency situations.           These are general instructions for a healthy lifestyle:    No smoking/ No tobacco products/ Avoid exposure to second hand smoke    Surgeon General's Warning:  Quitting smoking now greatly reduces serious risk to your health. Obesity, smoking, and sedentary lifestyle greatly increases your risk for illness    A healthy diet, regular physical exercise & weight monitoring are important for maintaining a healthy lifestyle    You may be retaining fluid if you have a history of heart failure or if you experience any of the following symptoms:  Weight gain of 3 pounds or more overnight or 5 pounds in a week, increased swelling in our hands or feet or shortness of breath while lying flat in bed. Please call your doctor as soon as you notice any of these symptoms; do not wait until your next office visit. Recognize signs and symptoms of STROKE:    B - Balance  E - Eyes    F-face looks uneven    A-arms unable to move or move even    S-speech slurred or non-existent    T-time-call 911 as soon as signs and symptoms begin-DO NOT go       Back to bed or wait to see if you get better-TIME IS BRAIN. If you have not received your influenza and/or pneumococcal vaccine, please follow up with your primary care physician. The discharge information has been reviewed with the patient and caregiver. The patient and caregiver verbalized understanding.

## 2022-02-28 NOTE — PERIOP NOTES
Permission received to review discharge instructions and discuss private health information with dtr and will have someone with them after discharge

## 2022-02-28 NOTE — PERIOP NOTES
HOB elevated. Sipping pepsi. Dr. Lorena Rascon to bedside to speak with patient-to be seen in office this afternoon. 1237Discharged to home via/wc,accompanied to car per RN. Skin warm and dry, awake and alert. Respirations even, unlabored. Pt and family members questions and concerns addressed prior to discharge. All belongings with pt.

## 2022-02-28 NOTE — OP NOTES
Date of Procedure: 2/28/2022  Preoperative Diagnosis: Nuclear Sclerotic cataract right eye H25.11  Postoperative Diagnosis: Nuclear Sclerotic cataract right eye H25.11  Procedure: Extracapsular cataract extraction with lens implant right eye  Surgeon:  SHIVANI Carroll MD  Assistants: None  Anesthesia: MAC with local  Estimated Blood Loss: None  Findings: Cataract right eye  Complications: None  Specimens: None  Prosthetic Devices: Intraocular lens implant     The patient's right eye was dilated with mydriacyl 1% and ciprofloxacin 0.3% for 3 doses preoperatively. The patient was taken to the operating room and was given sedation. Tetracaine was given topically to the right eye, and the eye was prepped and draped in the usual manner for sterile eye surgery, including Betadine solution being dropped onto the conjunctiva at the beginning of the prep. The eyelashes were isolated with a plastic drape. A lid speculum was placed.     A #15 blade was used to make a paracentesis at the 10:00 location. The eye was flushed with a lidocaine / epinephrine mixture (\"Shugarcaine\"). The eye was filled with Viscoat (Duovisc), and a crescent blade was used to make a 2.5 mm incision at the limbus temporally. This was dissected 2 mm into clear cornea, and the eye was entered with a 2.4 mm keratome. A 0.12 forceps was used for fixation during the procedure. A capsulorhexis flap was started with a cystotome, and this was completed 360 degrees with Utrata forceps. The capsular piece was removed. [place] dissection was performed with the \"Shugarcaine\" mixture on a cannula.     The lens nucleus was removed using phacoemulsification with a total phaco time of 2:42 minutes at 11.7%.     The lens was cracked and manipulated with a Sinsky hook. Residual cortex was removed using irrigation / aspiration.   The capsule remained intact.        The capsule was refilled with Provisc (Duovisc), and an Arie Intraocular lens model [id number] power 20.50 was placed in a lens folding cartridge with Provisc.     The lens was unfolded into the capsular bag. The lens centered well. Residual Provisc and Viscoat were removed using I / A. No suture was required to close the incision. The eye was flushed with BSS through the paracentesis. Betadine solution was placed on the conjunctival surface at the end of the case. The eye was left soft and formed at the end of the case. The incision site was water tight. The speculum was removed, and a drop of timolol 0.5% and neomycin/polymixin/dexamethasone ointment was placed on the eye followed by a shield. The patient tolerated the procedure well and is to follow-up in one day.

## 2022-03-18 PROBLEM — M48.061 LUMBAR STENOSIS: Status: ACTIVE | Noted: 2020-03-04

## 2022-03-18 PROBLEM — I10 ESSENTIAL HYPERTENSION: Status: ACTIVE | Noted: 2020-03-04

## 2022-03-19 PROBLEM — E78.5 HYPERLIPIDEMIA: Status: ACTIVE | Noted: 2020-03-04

## 2022-03-19 PROBLEM — M32.9 LUPUS (HCC): Status: ACTIVE | Noted: 2020-03-04

## 2022-03-19 PROBLEM — I25.118 CORONARY ARTERY DISEASE WITH STABLE ANGINA PECTORIS (HCC): Status: ACTIVE | Noted: 2020-09-30

## 2022-03-20 PROBLEM — F32.1 EPISODE OF MODERATE MAJOR DEPRESSION (HCC): Status: ACTIVE | Noted: 2020-03-04

## 2022-03-20 PROBLEM — I49.5 SICK SINUS SYNDROME (HCC): Status: ACTIVE | Noted: 2020-03-04

## 2022-03-20 PROBLEM — M17.11 ARTHRITIS OF RIGHT KNEE: Status: ACTIVE | Noted: 2020-03-04

## 2022-04-15 DIAGNOSIS — F32.1 EPISODE OF MODERATE MAJOR DEPRESSION (HCC): ICD-10-CM

## 2022-04-15 RX ORDER — CILOSTAZOL 50 MG/1
50 TABLET ORAL 2 TIMES DAILY
Qty: 180 TABLET | Refills: 1 | Status: SHIPPED | OUTPATIENT
Start: 2022-04-15 | End: 2022-10-03

## 2022-04-15 RX ORDER — SERTRALINE HYDROCHLORIDE 100 MG/1
100 TABLET, FILM COATED ORAL DAILY
Qty: 90 TABLET | Refills: 1 | Status: SHIPPED | OUTPATIENT
Start: 2022-04-15 | End: 2022-09-08

## 2022-04-15 NOTE — TELEPHONE ENCOUNTER
Refill Request (efrem sent a fax)    Requested Prescriptions     Pending Prescriptions Disp Refills    cilostazoL (PLETAL) 50 mg tablet 180 Tablet 0     Sig: Take 1 Tablet by mouth two (2) times a day.  sertraline (ZOLOFT) 100 mg tablet 90 Tablet 1     Sig: Take 1 Tablet by mouth daily.        Thank you

## 2022-04-29 RX ORDER — CARVEDILOL 25 MG/1
12.5 TABLET ORAL 2 TIMES DAILY WITH MEALS
Qty: 90 TABLET | Refills: 3 | Status: SHIPPED | OUTPATIENT
Start: 2022-04-29

## 2022-04-29 RX ORDER — CARVEDILOL 25 MG/1
25 TABLET ORAL DAILY
Qty: 90 TABLET | Refills: 3 | Status: SHIPPED | OUTPATIENT
Start: 2022-04-29 | End: 2022-04-29 | Stop reason: SDUPTHER

## 2022-06-05 DIAGNOSIS — F32.1 EPISODE OF MODERATE MAJOR DEPRESSION (HCC): ICD-10-CM

## 2022-06-05 DIAGNOSIS — I10 ESSENTIAL HYPERTENSION: ICD-10-CM

## 2022-06-06 RX ORDER — CLONIDINE HYDROCHLORIDE 0.2 MG/1
TABLET ORAL
Qty: 60 TABLET | Refills: 5 | Status: SHIPPED | OUTPATIENT
Start: 2022-06-06 | End: 2022-09-15 | Stop reason: SDUPTHER

## 2022-06-06 RX ORDER — HYDROCHLOROTHIAZIDE 12.5 MG/1
12.5 TABLET ORAL DAILY
Qty: 90 TABLET | Refills: 1 | Status: SHIPPED | OUTPATIENT
Start: 2022-06-06

## 2022-07-03 RX ORDER — SIMVASTATIN 40 MG/1
TABLET, FILM COATED ORAL
Qty: 90 TABLET | Refills: 1 | OUTPATIENT
Start: 2022-07-03

## 2022-07-15 RX ORDER — SIMVASTATIN 40 MG/1
40 TABLET, FILM COATED ORAL
Qty: 90 TABLET | Refills: 1 | Status: SHIPPED | OUTPATIENT
Start: 2022-07-15

## 2022-09-07 DIAGNOSIS — F32.1 EPISODE OF MODERATE MAJOR DEPRESSION (HCC): ICD-10-CM

## 2022-09-08 RX ORDER — SERTRALINE HYDROCHLORIDE 100 MG/1
100 TABLET, FILM COATED ORAL DAILY
Qty: 90 TABLET | Refills: 1 | Status: SHIPPED | OUTPATIENT
Start: 2022-09-08

## 2022-10-03 RX ORDER — CILOSTAZOL 50 MG/1
TABLET ORAL
Qty: 180 TABLET | Refills: 1 | Status: SHIPPED | OUTPATIENT
Start: 2022-10-03

## 2022-11-29 DIAGNOSIS — F32.1 EPISODE OF MODERATE MAJOR DEPRESSION (HCC): ICD-10-CM

## 2022-11-29 DIAGNOSIS — I10 ESSENTIAL HYPERTENSION: ICD-10-CM

## 2022-11-29 RX ORDER — CLONIDINE HYDROCHLORIDE 0.2 MG/1
TABLET ORAL
Qty: 60 TABLET | Refills: 0 | Status: SHIPPED | OUTPATIENT
Start: 2022-11-29

## 2022-11-29 RX ORDER — HYDROCHLOROTHIAZIDE 12.5 MG/1
12.5 TABLET ORAL DAILY
Qty: 90 TABLET | Refills: 0 | Status: SHIPPED | OUTPATIENT
Start: 2022-11-29

## 2022-12-19 ENCOUNTER — TELEPHONE (OUTPATIENT)
Dept: FAMILY MEDICINE CLINIC | Age: 72
End: 2022-12-19

## 2022-12-19 DIAGNOSIS — R92.8 ABNORMAL MAMMOGRAM: ICD-10-CM

## 2022-12-19 DIAGNOSIS — Z12.31 ENCOUNTER FOR SCREENING MAMMOGRAM FOR MALIGNANT NEOPLASM OF BREAST: Primary | ICD-10-CM

## 2022-12-19 NOTE — TELEPHONE ENCOUNTER
Edis Palmer [facility] needing [facility] Bi Lateral Diagnostic Mammogram and a Bi Lateral Breast Ultra Sound Limited for pt faxed to [phone] she can be reached at [phone] [facility] [address]

## 2022-12-29 DIAGNOSIS — F32.1 EPISODE OF MODERATE MAJOR DEPRESSION (HCC): ICD-10-CM

## 2022-12-29 RX ORDER — SIMVASTATIN 40 MG/1
TABLET, FILM COATED ORAL
Qty: 90 TABLET | Refills: 0 | Status: SHIPPED | OUTPATIENT
Start: 2022-12-29

## 2022-12-29 RX ORDER — CLONIDINE HYDROCHLORIDE 0.2 MG/1
TABLET ORAL
Qty: 60 TABLET | Refills: 0 | Status: SHIPPED | OUTPATIENT
Start: 2022-12-29

## 2023-02-08 DIAGNOSIS — I10 ESSENTIAL HYPERTENSION: ICD-10-CM

## 2023-02-08 DIAGNOSIS — F32.1 EPISODE OF MODERATE MAJOR DEPRESSION (HCC): ICD-10-CM

## 2023-02-08 RX ORDER — CILOSTAZOL 50 MG/1
50 TABLET ORAL 2 TIMES DAILY
Qty: 180 TABLET | Refills: 0 | Status: SHIPPED | OUTPATIENT
Start: 2023-02-08

## 2023-02-08 RX ORDER — HYDROCHLOROTHIAZIDE 12.5 MG/1
12.5 TABLET ORAL DAILY
Qty: 90 TABLET | Refills: 0 | Status: SHIPPED | OUTPATIENT
Start: 2023-02-08

## 2023-02-08 RX ORDER — SIMVASTATIN 40 MG/1
40 TABLET, FILM COATED ORAL
Qty: 90 TABLET | Refills: 0 | Status: SHIPPED | OUTPATIENT
Start: 2023-02-08

## 2023-02-08 RX ORDER — CARVEDILOL 25 MG/1
12.5 TABLET ORAL 2 TIMES DAILY WITH MEALS
Qty: 90 TABLET | Refills: 0 | Status: SHIPPED | OUTPATIENT
Start: 2023-02-08

## 2023-02-08 RX ORDER — CLONIDINE HYDROCHLORIDE 0.2 MG/1
0.2 TABLET ORAL 2 TIMES DAILY
Qty: 60 TABLET | Refills: 0 | Status: SHIPPED | OUTPATIENT
Start: 2023-02-08

## 2023-02-08 NOTE — TELEPHONE ENCOUNTER
----- Message from Shayy Zuleta sent at 2/8/2023  4:43 PM EST -----  Subject: Refill Request    QUESTIONS  Name of Medication? cloNIDine HCL (CATAPRES) 0.2 mg tablet  Patient-reported dosage and instructions? One tablet by mouth twice daily  How many days do you have left? 4  Preferred Pharmacy? [facility]  Pharmacy phone number (if available)? [phone]  Additional Information for Provider? 60 tablets  ---------------------------------------------------------------------------  --------------,  Name of Medication? simvastatin (ZOCOR) 40 mg tablet  Patient-reported dosage and instructions? Take one tablet by mouth every   night  How many days do you have left? 20  Preferred Pharmacy? [facility]  Pharmacy phone number (if available)? [phone]  Additional Information for Provider? 90 tablets  ---------------------------------------------------------------------------  --------------,  Name of Medication? carvediloL (COREG) 25 mg tablet  Patient-reported dosage and instructions? One tablet by mouth daily  How many days do you have left? 15  Preferred Pharmacy? [facility]  Pharmacy phone number (if available)? [phone]  ---------------------------------------------------------------------------  --------------,  Name of Medication? hydroCHLOROthiazide (HYDRODIURIL) 12.5 mg tablet  Patient-reported dosage and instructions? Take one tablet by mouth daily  How many days do you have left? 6  [facility]  Pharmacy phone number (if available)? [phone]  ---------------------------------------------------------------------------  --------------,  Name of Medication? cilostazoL (PLETAL) 50 mg tablet  Patient-reported dosage and instructions? Take one tablet by mouth twice   daily  How many days do you have left? 15  [facility] [facility] [address]  Pharmacy phone number (if available)? [phone]  ---------------------------------------------------------------------------  --------------  Malick PLATT  What is the best way for the office to contact you? OK to leave message on   voicemail  Preferred Call Back Phone Number? [phone]  ---------------------------------------------------------------------------  --------------  SCRIPT ANSWERS  Relationship to Patient?  Self

## 2023-02-14 DIAGNOSIS — F32.1 EPISODE OF MODERATE MAJOR DEPRESSION (HCC): ICD-10-CM

## 2023-02-14 RX ORDER — CLONIDINE HYDROCHLORIDE 0.2 MG/1
TABLET ORAL
Qty: 60 TABLET | Refills: 0 | Status: SHIPPED | OUTPATIENT
Start: 2023-02-14

## 2023-02-27 DIAGNOSIS — I10 ESSENTIAL HYPERTENSION: ICD-10-CM

## 2023-02-27 RX ORDER — HYDROCHLOROTHIAZIDE 12.5 MG/1
12.5 TABLET ORAL DAILY
Qty: 90 TABLET | Refills: 0 | Status: SHIPPED | OUTPATIENT
Start: 2023-02-27

## 2023-03-06 DIAGNOSIS — F32.1 EPISODE OF MODERATE MAJOR DEPRESSION (HCC): ICD-10-CM

## 2023-03-06 RX ORDER — SERTRALINE HYDROCHLORIDE 100 MG/1
100 TABLET, FILM COATED ORAL DAILY
Qty: 90 TABLET | Refills: 0 | Status: SHIPPED | OUTPATIENT
Start: 2023-03-06

## 2023-03-06 RX ORDER — CARVEDILOL 25 MG/1
TABLET ORAL
Qty: 90 TABLET | Refills: 0 | Status: SHIPPED | OUTPATIENT
Start: 2023-03-06

## 2023-03-07 ENCOUNTER — TELEPHONE (OUTPATIENT)
Dept: FAMILY MEDICINE CLINIC | Age: 73
End: 2023-03-07

## 2023-03-07 RX ORDER — PRAVASTATIN SODIUM 40 MG/1
40 TABLET ORAL
Qty: 90 TABLET | Refills: 3 | Status: SHIPPED | OUTPATIENT
Start: 2023-03-07

## 2023-03-07 NOTE — TELEPHONE ENCOUNTER
verified. Informed pt of message from provider regarding cholesterol medication. Pt verified understanding and had no further questions.

## 2023-03-07 NOTE — TELEPHONE ENCOUNTER
Received a fax from pharmacy that simvastatin is on backorder. We will switch to pravastatin.     Please make sure patient is aware of the change

## 2023-03-29 DIAGNOSIS — F32.1 EPISODE OF MODERATE MAJOR DEPRESSION (HCC): ICD-10-CM

## 2023-03-29 RX ORDER — SIMVASTATIN 40 MG/1
TABLET, FILM COATED ORAL
Qty: 90 TABLET | Refills: 0 | Status: SHIPPED | OUTPATIENT
Start: 2023-03-29

## 2023-03-29 RX ORDER — CLONIDINE HYDROCHLORIDE 0.2 MG/1
TABLET ORAL
Qty: 60 TABLET | Refills: 0 | Status: SHIPPED | OUTPATIENT
Start: 2023-03-29

## 2023-03-29 RX ORDER — CILOSTAZOL 50 MG/1
TABLET ORAL
Qty: 180 TABLET | Refills: 0 | Status: SHIPPED | OUTPATIENT
Start: 2023-03-29

## 2023-04-21 ENCOUNTER — OFFICE VISIT (OUTPATIENT)
Dept: FAMILY MEDICINE CLINIC | Age: 73
End: 2023-04-21

## 2023-04-21 VITALS
HEIGHT: 65 IN | HEART RATE: 76 BPM | SYSTOLIC BLOOD PRESSURE: 139 MMHG | TEMPERATURE: 98.4 F | WEIGHT: 163.4 LBS | RESPIRATION RATE: 20 BRPM | DIASTOLIC BLOOD PRESSURE: 76 MMHG | OXYGEN SATURATION: 98 % | BODY MASS INDEX: 27.22 KG/M2

## 2023-04-21 DIAGNOSIS — G89.29 CHRONIC LOW BACK PAIN, UNSPECIFIED BACK PAIN LATERALITY, UNSPECIFIED WHETHER SCIATICA PRESENT: ICD-10-CM

## 2023-04-21 DIAGNOSIS — Z11.59 NEED FOR HEPATITIS C SCREENING TEST: ICD-10-CM

## 2023-04-21 DIAGNOSIS — R55 NEAR SYNCOPE: ICD-10-CM

## 2023-04-21 DIAGNOSIS — E55.9 VITAMIN D DEFICIENCY: ICD-10-CM

## 2023-04-21 DIAGNOSIS — Z12.11 COLON CANCER SCREENING: ICD-10-CM

## 2023-04-21 DIAGNOSIS — E78.5 HYPERLIPIDEMIA, UNSPECIFIED HYPERLIPIDEMIA TYPE: ICD-10-CM

## 2023-04-21 DIAGNOSIS — E61.1 IRON DEFICIENCY: ICD-10-CM

## 2023-04-21 DIAGNOSIS — R73.02 IGT (IMPAIRED GLUCOSE TOLERANCE): ICD-10-CM

## 2023-04-21 DIAGNOSIS — I49.5 SICK SINUS SYNDROME (HCC): ICD-10-CM

## 2023-04-21 DIAGNOSIS — R23.2 HOT FLASHES: ICD-10-CM

## 2023-04-21 DIAGNOSIS — I25.118 CORONARY ARTERY DISEASE OF NATIVE HEART WITH STABLE ANGINA PECTORIS, UNSPECIFIED VESSEL OR LESION TYPE (HCC): ICD-10-CM

## 2023-04-21 DIAGNOSIS — E53.8 B12 DEFICIENCY: ICD-10-CM

## 2023-04-21 DIAGNOSIS — M54.50 CHRONIC LOW BACK PAIN, UNSPECIFIED BACK PAIN LATERALITY, UNSPECIFIED WHETHER SCIATICA PRESENT: ICD-10-CM

## 2023-04-21 DIAGNOSIS — M48.062 SPINAL STENOSIS OF LUMBAR REGION WITH NEUROGENIC CLAUDICATION: ICD-10-CM

## 2023-04-21 DIAGNOSIS — I10 ESSENTIAL HYPERTENSION: ICD-10-CM

## 2023-04-21 DIAGNOSIS — F32.1 EPISODE OF MODERATE MAJOR DEPRESSION (HCC): ICD-10-CM

## 2023-04-21 DIAGNOSIS — Z23 ENCOUNTER FOR IMMUNIZATION: ICD-10-CM

## 2023-04-21 DIAGNOSIS — Z00.00 ROUTINE GENERAL MEDICAL EXAMINATION AT A HEALTH CARE FACILITY: Primary | ICD-10-CM

## 2023-04-21 NOTE — PROGRESS NOTES
Health Maintenance Due   Topic Date Due    Hepatitis C Screening  Never done    Lipid Screen  Never done    Colorectal Cancer Screening Combo  Never done    Shingles Vaccine (1 of 2) Never done    Pneumococcal 65+ years (1 - PCV) Never done    COVID-19 Vaccine (3 - Booster for Moderna series) 09/08/2021    Medicare Yearly Exam  03/25/2022    Depression Monitoring  12/29/2022     1. \"Have you been to the ER, urgent care clinic since your last visit? Hospitalized since your last visit? \"  Yes    2. \"Have you seen or consulted any other health care providers outside of the [address] [place] [address] since your last visit? \"  Yes      3. For patients aged 39-70: Has the patient had a colonoscopy / FIT/ Cologuard? Yes - Care Gap present. Rooming MA/LPN to request most recent results  yes. Latonia Zarate      If the patient is female:    4. For patients aged 41-77: Has the patient had a mammogram within the past 2 years? NA - based on age or sex      11. For patients aged 21-65: Has the patient had a pap smear? NA - based on age or sex    Do you have an [address] in place in the event that you have a healthcare crisis that could impact your decision making as it pertains to your health? NO    Would you like information about Advance Care Planning? NO    Information given.  NO

## 2023-04-21 NOTE — PROGRESS NOTES
Medicare Annual Wellness Visit    I have reviewed the patient's medical history in detail and updated the computerized patient record. History   Brittani Andrew is a 74 y.o. female who presents to follow-up on chronic medical issues. She complains of lightheadedness for last 1 to 2 months. Only present when standing. Is actually fallen because of this. Denies syncope but this does sound like near syncope. She is unaware of what could have changed to cause this. She has been feeling depressed. Has been taking her Zoloft 100 mg consistently. She has lost 30 pounds. It is unclear to me whether this is intentional.  She is happy she lost 30 pounds. She is not sure she feels any better at the lower weight    \"I stay hot\". She feels like her stamina is low    I see that she reestablished with her rheumatologist last month after not being seen for 7 years. Rheumatologist felt that inflammatory arthritis is currently inactive and that lumbar and knees are osteoarthritis. Refer to physical therapy which she has not started yet    Past Medical History:   Diagnosis Date    Arrhythmia     Arthritis     CAD (coronary artery disease)     Cervical cancer ([facility] [address]     Chronic pain     Depression     Hypertension     Lupus (systemic lupus erythematosus) ([facility] [address]     Personal history of COVID-19 01/13/2022    + preop test, asymptomatic    Stroke [facility])     Left sided weakness      Past Surgical History:   Procedure Laterality Date    HX CATARACT REMOVAL Left 02/2022    HX HEART CATHETERIZATION      HX HYSTERECTOMY  1974    HX KNEE REPLACEMENT Right 10/2019    HX PACEMAKER   approx.   2000    SC UNLISTED PROCEDURE VASCULAR SURGERY       Current Outpatient Medications   Medication Sig Dispense Refill    cilostazoL (PLETAL) 50 mg tablet TAKE 1 TABLET BY MOUTH TWICE DAILY 180 Tablet 0    simvastatin (ZOCOR) 40 mg tablet TAKE 1 TABLET BY MOUTH EVERY NIGHT 90 Tablet 0    cloNIDine HCL (CATAPRES) 0.2 mg tablet TAKE 1 TABLET BY MOUTH TWICE DAILY 60 Tablet 0    carvediloL (COREG) 25 mg tablet TAKE 1 TABLET BY MOUTH DAILY (Patient taking differently: Take 0.5 Tablets by mouth two (2) times daily (with meals). ) 90 Tablet 0    hydroCHLOROthiazide (HYDRODIURIL) 12.5 mg tablet TAKE 1 TABLET BY MOUTH DAILY 90 Tablet 0    aspirin delayed-release 81 mg tablet Take 1 Tablet by mouth daily. fluocinoNIDE (LIDEX) 0.05 % topical cream Apply 0.5 g to affected area two (2) times a day. As needed (Patient taking differently: Apply 0.5 g to affected area three (3) times daily. As needed) 45 g 2    cholecalciferol (VITAMIN D3) (1000 Units /25 mcg) tablet Take 2 Tablets by mouth daily. pravastatin (PRAVACHOL) 40 mg tablet Take 1 Tablet by mouth nightly. (Patient not taking: Reported on 2023) 90 Tablet 3    sertraline (ZOLOFT) 100 mg tablet TAKE 1 TABLET BY MOUTH DAILY (Patient not taking: Reported on 2023) 90 Tablet 0     Allergies   Allergen Reactions    Iodinated Contrast Media Anaphylaxis     Reaction Type: Allergy; Severity: Severe    Peanut Anaphylaxis     Causes choking    Adhesive Rash and Other (comments)     Skin removal    Lisinopril Rash and Angioedema    Oxycodone-Acetaminophen Itching     Reaction Type: Allergy;  Severity: Moderate     Family History   Problem Relation Age of Onset    Lung Disease Father     OSTEOARTHRITIS Daughter      Social History     Tobacco Use    Smoking status: Former     Types: Cigarettes     Quit date: 2015     Years since quittin.1    Smokeless tobacco: Former     Quit date: 3/4/2015   Substance Use Topics    Alcohol use: Not Currently     Alcohol/week: 3.0 standard drinks     Types: 3 Cans of beer per week     Comment: occasionally     Patient Active Problem List   Diagnosis Code    Lupus ([facility] [address] M32.9    Sick sinus syndrome ([facility] [address] I49.5    Essential hypertension I10    Hyperlipidemia E78.5    Lumbar stenosis M48.061    Episode of moderate major depression ([facility] [address] F32.1    Arthritis of right knee M17.11    Coronary artery disease with stable angina pectoris ([facility] [address] I25.118       Depression Risk Factor Screening:     3 most recent PHQ Screens 4/21/2023   Little interest or pleasure in doing things Several days   Feeling down, depressed, irritable, or hopeless Several days   Total Score PHQ 2 2   Trouble falling or staying asleep, or sleeping too much -   Feeling tired or having little energy -   Poor appetite, weight loss, or overeating -   Feeling bad about yourself - or that you are a failure or have let yourself or your family down -   Trouble concentrating on things such as school, work, reading, or watching TV -   Moving or speaking so slowly that other people could have noticed; or the opposite being so fidgety that others notice -   Thoughts of being better off dead, or hurting yourself in some way -   PHQ 9 Score -   How difficult have these problems made it for you to do your work, take care of your home and get along with others -     Alcohol Risk Factor Screening: On any occasion during the past 3 months, have you had more than 3 drinks containing alcohol? No    Do you average more than 7 drinks per week? No      Functional Ability and Level of Safety:     Hearing Loss   none    Activities of Daily Living   Self-care. Requires assistance with: no ADLs    Fall Risk     Fall Risk Assessment, last 12 mths 4/21/2023   Able to walk? Yes   Fall in past 12 months? 1   Do you feel unsteady? 0   Are you worried about falling 1   Number of falls in past 12 months 1   Fall with injury? 0     Abuse Screen   Patient is not abused    Review of Systems   ROS:  As listed in HPI.  In addition:  Constitutional:   No headache, fever, fatigue, weight loss or weight gain      Eyes:   No redness, pruritis, pain, visual changes, swelling, or discharge      Ears:    No pain, loss or changes in hearing     Cardiac:    No chest pain      Resp:   No cough or shortness of breath      Neuro   No loss of consciousness, dizziness, seizure    Physical Examination     Evaluation of Cognitive Function:  Mood/affect:  happy  Appearance: age appropriate  Family member/caregiver input:     Physical Exam:  Blood pressure 139/76, pulse 76, temperature 98.4 °F (36.9 °C), temperature source Temporal, resp. rate 20, height 5' 5\" (1.651 m), weight 163 lb 6.4 oz (74.1 kg), SpO2 98 %. GEN: No apparent distress. Alert and oriented and responds to all questions appropriately. EYES:  Conjunctiva clear; pupils round and reactive to light; extraocular movements are intact. EAR: External ears are normal.  Tympanic membranes are clear and without effusion. NOSE: Turbinates are within normal limits. No drainage  OROPHYARYNX: No oral lesions or exudates. NECK:  Supple; no masses; thyroid normal           LUNGS: Respirations unlabored; clear to auscultation bilaterally  CARDIOVASCULAR: Regular, rate, and rhythm without murmurs   NEUROLOGIC:  No focal neurologic deficits. Strength and sensation grossly intact. Coordination and gait grossly intact. EXT: Well perfused. No edema. SKIN: No obvious rashes. Patient Care Team:  Cristino Burrell MD as PCP - General (Family Medicine)  Cristino Burrell MD as PCP - [facility] Empaneled Provider    Advice/Referrals/Counseling   Education and counseling provided:    Referred for 12-year-old colonoscopy. Recommend shingles vaccine. Recommend pneumonia 20    Assessment/Plan     Goals  Supine 160/94 pulse 72  Sitting 140/86 pulse 77  Standing 147/91 pulse 75. Complaint of dizziness    Hypertension  Well-controlled  Hydrochlorothiazide 12.5  Carvedilol 12.5 twice daily  Clonidine 0.2 mg.    Lightheaded, near syncope  EKG NSR Not revealing  Orthostatic vitals reassuring  Drug side effect? Nothing new started recently  Labs  Has not seen a cardiologist for sick sinus syndrome. Pacemaker. Point her back there      Hot flashes  Possibilities could be possible although late in life.   Will look for other signs of cancer  Weight loss  Intentional/unintentional?  She cannot report. We will bear this in mind during work-up          ICD-10-CM ICD-9-CM    1. Routine general medical examination at a health care facility  Z00.00 V70.0       2. Essential hypertension  I10 401.9 TSH 3RD GENERATION      METABOLIC PANEL, COMPREHENSIVE      CBC WITH AUTOMATED DIFF      LIPID PANEL      TSH 3RD GENERATION      METABOLIC PANEL, COMPREHENSIVE      CBC WITH AUTOMATED DIFF      LIPID PANEL      3. Chronic low back pain, unspecified back pain laterality, unspecified whether sciatica present  M54.50 724.2     G89.29 338.29       4. Spinal stenosis of lumbar region with neurogenic claudication  M48.062 724.03       5. Sick sinus syndrome (HCC)  I49.5 427.81       6. Episode of moderate major depression (HCC)  F32.1 296.22       7. Coronary artery disease of native heart with stable angina pectoris, unspecified vessel or lesion type ([facility] [address]  I25.118 414.01      413.9       8. Hyperlipidemia, unspecified hyperlipidemia type  E78.5 272.4       9. Near syncope  R55 780.2 AMB POC EKG ROUTINE W/ 12 LEADS, INTER & REP      10. Hot flashes  R23.2 782.62       11. Need for hepatitis C screening test  Z11.59 V73.89 HEPATITIS C AB      HEPATITIS C AB      12. Colon cancer screening  Z12.11 V76.51 REFERRAL TO GASTROENTEROLOGY      13. Encounter for immunization  Z23 V03.89 PNEUMOCOCCAL, PCV20, PREVNAR 20, (AGE 18 YRS+), IM, PF      ADMIN PNEUMOCOCCAL VACCINE      14. Vitamin D deficiency  E55.9 268.9 VITAMIN D, 25 HYDROXY      VITAMIN D, 25 HYDROXY      15. B12 deficiency  E53.8 266.2 VITAMIN B12 & FOLATE      VITAMIN B12 & FOLATE      16. Iron deficiency  E61.1 280.9 IRON PROFILE      FERRITIN      IRON PROFILE      FERRITIN      17.  IGT (impaired glucose tolerance)  R73.02 790.22 HEMOGLOBIN A1C WITH EAG      HEMOGLOBIN A1C WITH EAG

## 2023-04-22 LAB
25(OH)D3+25(OH)D2 SERPL-MCNC: 122 NG/ML (ref 30–100)
ALBUMIN SERPL-MCNC: 4.3 G/DL (ref 3.7–4.7)
ALBUMIN/GLOB SERPL: 1.8 {RATIO} (ref 1.2–2.2)
ALP SERPL-CCNC: 77 IU/L (ref 44–121)
ALT SERPL-CCNC: 13 IU/L (ref 0–32)
AST SERPL-CCNC: 20 IU/L (ref 0–40)
BASOPHILS # BLD AUTO: 0 X10E3/UL (ref 0–0.2)
BASOPHILS NFR BLD AUTO: 1 %
BILIRUB SERPL-MCNC: 0.3 MG/DL (ref 0–1.2)
BUN SERPL-MCNC: 16 MG/DL (ref 8–27)
BUN/CREAT SERPL: 17 (ref 12–28)
CALCIUM SERPL-MCNC: 9.7 MG/DL (ref 8.7–10.3)
CHLORIDE SERPL-SCNC: 104 MMOL/L (ref 96–106)
CHOLEST SERPL-MCNC: 157 MG/DL (ref 100–199)
CO2 SERPL-SCNC: 23 MMOL/L (ref 20–29)
CREAT SERPL-MCNC: 0.92 MG/DL (ref 0.57–1)
EGFRCR SERPLBLD CKD-EPI 2021: 65 ML/MIN/1.73
EOSINOPHIL # BLD AUTO: 0.1 X10E3/UL (ref 0–0.4)
EOSINOPHIL NFR BLD AUTO: 1 %
ERYTHROCYTE [DISTWIDTH] IN BLOOD BY AUTOMATED COUNT: 13.4 % (ref 11.7–15.4)
EST. AVERAGE GLUCOSE BLD GHB EST-MCNC: 114 MG/DL
FERRITIN SERPL-MCNC: 134 NG/ML (ref 15–150)
FOLATE SERPL-MCNC: 8.7 NG/ML
GLOBULIN SER CALC-MCNC: 2.4 G/DL (ref 1.5–4.5)
GLUCOSE SERPL-MCNC: 82 MG/DL (ref 70–99)
HBA1C MFR BLD: 5.6 % (ref 4.8–5.6)
HCT VFR BLD AUTO: 38.3 % (ref 34–46.6)
HCV IGG SERPL QL IA: NON REACTIVE
HDLC SERPL-MCNC: 76 MG/DL
HGB BLD-MCNC: 13 G/DL (ref 11.1–15.9)
IMM GRANULOCYTES # BLD AUTO: 0 X10E3/UL (ref 0–0.1)
IMM GRANULOCYTES NFR BLD AUTO: 0 %
IMP & REVIEW OF LAB RESULTS: NORMAL
IRON SATN MFR SERPL: 37 % (ref 15–55)
IRON SERPL-MCNC: 91 UG/DL (ref 27–139)
LDLC SERPL CALC-MCNC: 64 MG/DL (ref 0–99)
LYMPHOCYTES # BLD AUTO: 2.1 X10E3/UL (ref 0.7–3.1)
LYMPHOCYTES NFR BLD AUTO: 33 %
MCH RBC QN AUTO: 32 PG (ref 26.6–33)
MCHC RBC AUTO-ENTMCNC: 33.9 G/DL (ref 31.5–35.7)
MCV RBC AUTO: 94 FL (ref 79–97)
MONOCYTES # BLD AUTO: 0.6 X10E3/UL (ref 0.1–0.9)
MONOCYTES NFR BLD AUTO: 10 %
NEUTROPHILS # BLD AUTO: 3.4 X10E3/UL (ref 1.4–7)
NEUTROPHILS NFR BLD AUTO: 55 %
PLATELET # BLD AUTO: 185 X10E3/UL (ref 150–450)
POTASSIUM SERPL-SCNC: 3.7 MMOL/L (ref 3.5–5.2)
PROT SERPL-MCNC: 6.7 G/DL (ref 6–8.5)
RBC # BLD AUTO: 4.06 X10E6/UL (ref 3.77–5.28)
SODIUM SERPL-SCNC: 144 MMOL/L (ref 134–144)
TIBC SERPL-MCNC: 246 UG/DL (ref 250–450)
TRIGL SERPL-MCNC: 92 MG/DL (ref 0–149)
TSH SERPL DL<=0.005 MIU/L-ACNC: 1.88 UIU/ML (ref 0.45–4.5)
UIBC SERPL-MCNC: 155 UG/DL (ref 118–369)
VIT B12 SERPL-MCNC: 744 PG/ML (ref 232–1245)
VLDLC SERPL CALC-MCNC: 17 MG/DL (ref 5–40)
WBC # BLD AUTO: 6.3 X10E3/UL (ref 3.4–10.8)

## 2023-04-28 DIAGNOSIS — F32.1 EPISODE OF MODERATE MAJOR DEPRESSION (HCC): ICD-10-CM

## 2023-04-28 RX ORDER — CLONIDINE HYDROCHLORIDE 0.2 MG/1
TABLET ORAL
Qty: 60 TABLET | Refills: 3 | Status: SHIPPED | OUTPATIENT
Start: 2023-04-28

## 2023-05-30 RX ORDER — HYDROCHLOROTHIAZIDE 12.5 MG/1
TABLET ORAL
Qty: 90 TABLET | Refills: 3 | Status: SHIPPED | OUTPATIENT
Start: 2023-05-30

## 2023-06-02 RX ORDER — SERTRALINE HYDROCHLORIDE 100 MG/1
TABLET, FILM COATED ORAL
Qty: 90 TABLET | Refills: 3 | Status: SHIPPED | OUTPATIENT
Start: 2023-06-02

## 2023-06-02 RX ORDER — CARVEDILOL 25 MG/1
TABLET ORAL
Qty: 90 TABLET | Refills: 3 | Status: SHIPPED | OUTPATIENT
Start: 2023-06-02

## 2023-06-24 NOTE — TELEPHONE ENCOUNTER
Pt called stating that [facility] left her a message stated that the order we sent was incorrect. Please contact [facility] about what is needed. Explained that [facility] will contact her to schedule appt for imaging once they have the correct orders. Explained the same for her eye appt. Pt thought we would schedule appts for her. Pt asked about the referral for her back. Pt wants referral to go to [facility]. She doesn't want to see any dr other than the one who has already treated her.  Pt's daughter will be in contact with us shortly with the name of the . Glenn Bear we can set up the referral.
Pt's daughter called back to give referral information. Daughter states referral is for injections that her mother gets on an annual basis. There is no one doctor that she sees. She is asking for referral be sent to [facility] outpatient center - radiology\" fax - [phone]    Explained a nurse would call if there were any questions.
24-Jun-2023 16:46

## 2023-06-27 RX ORDER — SIMVASTATIN 40 MG
TABLET ORAL
Qty: 90 TABLET | Refills: 3 | Status: SHIPPED | OUTPATIENT
Start: 2023-06-27

## 2023-06-27 RX ORDER — CILOSTAZOL 50 MG/1
TABLET ORAL
Qty: 180 TABLET | Refills: 3 | Status: SHIPPED | OUTPATIENT
Start: 2023-06-27

## 2023-08-28 RX ORDER — CLONIDINE HYDROCHLORIDE 0.2 MG/1
TABLET ORAL
Qty: 60 TABLET | Refills: 3 | Status: SHIPPED | OUTPATIENT
Start: 2023-08-28

## 2023-11-24 RX ORDER — CLONIDINE HYDROCHLORIDE 0.2 MG/1
TABLET ORAL
Qty: 60 TABLET | Refills: 1 | Status: SHIPPED | OUTPATIENT
Start: 2023-11-24

## 2024-04-29 RX ORDER — CARVEDILOL 25 MG/1
25 TABLET ORAL DAILY
Qty: 90 TABLET | Refills: 0 | Status: SHIPPED | OUTPATIENT
Start: 2024-04-29

## 2024-05-21 RX ORDER — SIMVASTATIN 40 MG
TABLET ORAL
Qty: 90 TABLET | Refills: 0 | Status: SHIPPED | OUTPATIENT
Start: 2024-05-21

## 2024-05-21 RX ORDER — SERTRALINE HYDROCHLORIDE 100 MG/1
TABLET, FILM COATED ORAL
Qty: 90 TABLET | Refills: 0 | Status: SHIPPED | OUTPATIENT
Start: 2024-05-21

## 2024-05-21 RX ORDER — HYDROCHLOROTHIAZIDE 12.5 MG/1
TABLET ORAL
Qty: 90 TABLET | Refills: 0 | Status: SHIPPED | OUTPATIENT
Start: 2024-05-21

## 2024-05-21 RX ORDER — CILOSTAZOL 50 MG/1
TABLET ORAL
Qty: 180 TABLET | Refills: 0 | Status: SHIPPED | OUTPATIENT
Start: 2024-05-21

## 2024-08-19 RX ORDER — CILOSTAZOL 50 MG/1
TABLET ORAL
Qty: 180 TABLET | Refills: 0 | Status: SHIPPED | OUTPATIENT
Start: 2024-08-19

## 2024-08-19 RX ORDER — SERTRALINE HYDROCHLORIDE 100 MG/1
TABLET, FILM COATED ORAL
Qty: 90 TABLET | Refills: 0 | Status: SHIPPED | OUTPATIENT
Start: 2024-08-19

## 2024-08-19 RX ORDER — HYDROCHLOROTHIAZIDE 12.5 MG/1
TABLET ORAL
Qty: 90 TABLET | Refills: 0 | Status: SHIPPED | OUTPATIENT
Start: 2024-08-19

## 2024-08-19 RX ORDER — SIMVASTATIN 40 MG
TABLET ORAL
Qty: 90 TABLET | Refills: 0 | Status: SHIPPED | OUTPATIENT
Start: 2024-08-19

## 2024-08-22 RX ORDER — CARVEDILOL 25 MG/1
25 TABLET ORAL DAILY
Qty: 90 TABLET | Refills: 0 | Status: SHIPPED | OUTPATIENT
Start: 2024-08-22 | End: 2024-08-26

## 2024-08-26 RX ORDER — CARVEDILOL 25 MG/1
25 TABLET ORAL DAILY
Qty: 90 TABLET | Refills: 3 | Status: SHIPPED | OUTPATIENT
Start: 2024-08-26

## 2024-11-15 RX ORDER — CILOSTAZOL 50 MG/1
TABLET ORAL
Qty: 180 TABLET | Refills: 0 | OUTPATIENT
Start: 2024-11-15

## 2024-11-15 RX ORDER — HYDROCHLOROTHIAZIDE 12.5 MG/1
TABLET ORAL
Qty: 90 TABLET | Refills: 0 | OUTPATIENT
Start: 2024-11-15

## 2024-11-15 RX ORDER — SERTRALINE HYDROCHLORIDE 100 MG/1
TABLET, FILM COATED ORAL
Qty: 90 TABLET | Refills: 0 | OUTPATIENT
Start: 2024-11-15

## 2024-11-15 RX ORDER — SIMVASTATIN 40 MG
TABLET ORAL
Qty: 90 TABLET | Refills: 0 | OUTPATIENT
Start: 2024-11-15

## 2025-01-13 RX ORDER — CLONIDINE HYDROCHLORIDE 0.2 MG/1
TABLET ORAL
Qty: 60 TABLET | Refills: 1 | OUTPATIENT
Start: 2025-01-13

## 2025-01-13 RX ORDER — CARVEDILOL 25 MG/1
TABLET ORAL
Qty: 90 TABLET | Refills: 3 | OUTPATIENT
Start: 2025-01-13

## (undated) DEVICE — TOWEL SURG W17XL27IN STD BLU COT NONFENESTRATED PREWASHED

## (undated) DEVICE — GLOVE SURG SZ 75 CRM LTX FREE POLYISOPRENE POLYMER BEAD ANTI

## (undated) DEVICE — THE MONARCH® "D" CARTRIDGE IS A SINGLE-USE POLYPROPYLENE CARTRIDGE FOR POSTERIOR CHAMBER IOL DELIVERY: Brand: MONARCH® III

## (undated) DEVICE — SUTURE VCRL SZ 10-0 L12IN ABSRB VLT L5.5MM CS160-6 1/2 CIR V448G

## (undated) DEVICE — SOLUTION IRRIG 500ML STRL H2O NONPYROGENIC

## (undated) DEVICE — SOLUTION IV 250ML 0.9% SOD CHL CLR INJ FLX BG CONT PRT CLSR

## (undated) DEVICE — SURGICAL PROCEDURE PACK EYE CUST DR CHNDLR

## (undated) DEVICE — KENDALL DL ECG CABLE AND LEAD WIRE SYSTEM, 3-LEAD, SINGLE PATIENT USE: Brand: KENDALL

## (undated) DEVICE — SURGICAL PROCEDURE PACK CATRCT CUST